# Patient Record
Sex: MALE | Race: WHITE
[De-identification: names, ages, dates, MRNs, and addresses within clinical notes are randomized per-mention and may not be internally consistent; named-entity substitution may affect disease eponyms.]

---

## 2019-08-12 ENCOUNTER — HOSPITAL ENCOUNTER (EMERGENCY)
Age: 49
Discharge: HOME | End: 2019-08-12
Payer: MEDICARE

## 2019-08-12 VITALS
SYSTOLIC BLOOD PRESSURE: 132 MMHG | RESPIRATION RATE: 19 BRPM | HEART RATE: 78 BPM | OXYGEN SATURATION: 97 % | DIASTOLIC BLOOD PRESSURE: 65 MMHG

## 2019-08-12 VITALS
HEART RATE: 115 BPM | RESPIRATION RATE: 18 BRPM | TEMPERATURE: 97.7 F | OXYGEN SATURATION: 97 % | DIASTOLIC BLOOD PRESSURE: 67 MMHG | SYSTOLIC BLOOD PRESSURE: 114 MMHG

## 2019-08-12 VITALS
OXYGEN SATURATION: 97 % | RESPIRATION RATE: 17 BRPM | DIASTOLIC BLOOD PRESSURE: 86 MMHG | SYSTOLIC BLOOD PRESSURE: 123 MMHG | HEART RATE: 97 BPM

## 2019-08-12 VITALS — BODY MASS INDEX: 22.8 KG/M2

## 2019-08-12 VITALS — RESPIRATION RATE: 16 BRPM

## 2019-08-12 DIAGNOSIS — F32.9: Primary | ICD-10-CM

## 2019-08-12 DIAGNOSIS — G10: ICD-10-CM

## 2019-08-12 LAB
ALCOHOL, BLOOD (MEDICAL)-SERUM: < 3 MG/DL
AMPHET UR-MCNC: NEGATIVE NG/ML
ANION GAP: 4 (ref 5–15)
BARBITURATE URINE VISTA: NEGATIVE
BENZODIAZEPINE URINE VISTA: NEGATIVE
BUN SERPL-MCNC: 16 MG/DL (ref 7–18)
BUN/CREAT RATIO: 14.4 RATIO (ref 10–20)
CALCIUM SERPL-MCNC: 9.1 MG/DL (ref 8.5–10.1)
CARBON DIOXIDE: 29 MMOL/L (ref 21–32)
CHLORIDE: 107 MMOL/L (ref 98–107)
COCAINE URINE VISTA: NEGATIVE
DEPRECATED RDW RBC: 43.7 FL (ref 35.1–43.9)
DRUG CONFIRMATION TO FOLLOW?: (no result)
ECSTACY URINE VISTA: NEGATIVE
ERYTHROCYTE [DISTWIDTH] IN BLOOD: 12.7 % (ref 11.6–14.6)
EST GLOM FILT RATE - AFR AMER: 91 ML/MIN (ref 60–?)
ESTIMATED CREATININE CLEARANCE: 81.81 ML/MIN
GLUCOSE: 92 MG/DL (ref 74–106)
HCT VFR BLD AUTO: 44.5 % (ref 40–54)
HEMOGLOBIN: 14.7 G/DL (ref 13–16.5)
HGB BLD-MCNC: 14.7 G/DL (ref 13–16.5)
IMMATURE GRANULOCYTES COUNT: 0.04 X10^3/UL (ref 0–0)
MCV RBC: 92.9 FL (ref 80–94)
MEAN CORP HGB CONC: 33 G/DL (ref 32–36)
MEAN PLATELET VOL.: 9.4 FL (ref 6.2–12)
METHADONE URINE VISTA: NEGATIVE
NRBC FLAGGED BY ANALYZER: 0 % (ref 0–5)
PCP UR QL: NEGATIVE
PH UR: 6 [PH]
PLATELET # BLD: 233 K/MM3 (ref 150–450)
PLATELET COUNT: 233 K/MM3 (ref 150–450)
POTASSIUM: 4 MMOL/L (ref 3.5–5.1)
RBC # BLD AUTO: 4.79 M/MM3 (ref 4.6–6.2)
RBC DISTRIBUTION WIDTH CV: 12.7 % (ref 11.6–14.6)
RBC DISTRIBUTION WIDTH SD: 43.7 FL (ref 35.1–43.9)
THC URINE VISTA: NEGATIVE
WBC # BLD AUTO: 10 K/MM3 (ref 4.4–11)
WHITE BLOOD COUNT: 10 K/MM3 (ref 4.4–11)

## 2019-08-12 PROCEDURE — 80048 BASIC METABOLIC PNL TOTAL CA: CPT

## 2019-08-12 PROCEDURE — G0480 DRUG TEST DEF 1-7 CLASSES: HCPCS

## 2019-08-12 PROCEDURE — 36415 COLL VENOUS BLD VENIPUNCTURE: CPT

## 2019-08-12 PROCEDURE — 80307 DRUG TEST PRSMV CHEM ANLYZR: CPT

## 2019-08-12 PROCEDURE — 85025 COMPLETE CBC W/AUTO DIFF WBC: CPT

## 2019-08-12 PROCEDURE — 99282 EMERGENCY DEPT VISIT SF MDM: CPT

## 2019-08-12 PROCEDURE — 80320 DRUG SCREEN QUANTALCOHOLS: CPT

## 2019-12-19 ENCOUNTER — HOSPITAL ENCOUNTER (OUTPATIENT)
Dept: HOSPITAL 100 - SP | Age: 49
Discharge: HOME | End: 2019-12-19
Payer: MEDICARE

## 2019-12-19 VITALS — BODY MASS INDEX: 22.8 KG/M2

## 2019-12-19 DIAGNOSIS — G10: ICD-10-CM

## 2019-12-19 DIAGNOSIS — I69.891: Primary | ICD-10-CM

## 2019-12-19 DIAGNOSIS — R26.9: ICD-10-CM

## 2019-12-19 DIAGNOSIS — R47.1: ICD-10-CM

## 2019-12-19 PROCEDURE — 92523 SPEECH SOUND LANG COMPREHEN: CPT

## 2019-12-19 PROCEDURE — 97161 PT EVAL LOW COMPLEX 20 MIN: CPT

## 2019-12-19 PROCEDURE — 92610 EVALUATE SWALLOWING FUNCTION: CPT

## 2019-12-19 PROCEDURE — 97166 OT EVAL MOD COMPLEX 45 MIN: CPT

## 2019-12-19 PROCEDURE — 92507 TX SP LANG VOICE COMM INDIV: CPT

## 2019-12-19 PROCEDURE — 97110 THERAPEUTIC EXERCISES: CPT

## 2020-06-25 ENCOUNTER — HOSPITAL ENCOUNTER (OUTPATIENT)
Dept: HOSPITAL 100 - PT | Age: 50
Discharge: HOME | End: 2020-06-25
Payer: MEDICARE

## 2020-06-25 VITALS — BODY MASS INDEX: 22.8 KG/M2

## 2020-06-25 DIAGNOSIS — R25.2: ICD-10-CM

## 2020-06-25 DIAGNOSIS — R26.9: Primary | ICD-10-CM

## 2020-06-25 DIAGNOSIS — G10: ICD-10-CM

## 2020-06-25 DIAGNOSIS — F02.80: ICD-10-CM

## 2020-06-25 PROCEDURE — 97110 THERAPEUTIC EXERCISES: CPT

## 2020-06-25 PROCEDURE — 97164 PT RE-EVAL EST PLAN CARE: CPT

## 2020-06-25 PROCEDURE — 97162 PT EVAL MOD COMPLEX 30 MIN: CPT

## 2021-03-29 ENCOUNTER — HOSPITAL ENCOUNTER (OUTPATIENT)
Dept: HOSPITAL 100 - PT | Age: 51
Discharge: HOME | End: 2021-03-29
Payer: MEDICARE

## 2021-03-29 VITALS — BODY MASS INDEX: 22.8 KG/M2

## 2021-03-29 DIAGNOSIS — G25.5: ICD-10-CM

## 2021-03-29 DIAGNOSIS — G10: Primary | ICD-10-CM

## 2021-03-29 DIAGNOSIS — R26.9: ICD-10-CM

## 2021-03-29 DIAGNOSIS — R25.2: ICD-10-CM

## 2021-03-29 PROCEDURE — 97164 PT RE-EVAL EST PLAN CARE: CPT

## 2021-03-29 PROCEDURE — 97110 THERAPEUTIC EXERCISES: CPT

## 2021-03-29 PROCEDURE — 97530 THERAPEUTIC ACTIVITIES: CPT

## 2021-03-29 PROCEDURE — 97166 OT EVAL MOD COMPLEX 45 MIN: CPT

## 2021-03-29 PROCEDURE — 97162 PT EVAL MOD COMPLEX 30 MIN: CPT

## 2021-12-07 ENCOUNTER — HOSPITAL ENCOUNTER (OUTPATIENT)
Age: 51
End: 2021-12-07
Payer: MEDICARE

## 2021-12-07 DIAGNOSIS — H50.53: ICD-10-CM

## 2021-12-07 DIAGNOSIS — G10: ICD-10-CM

## 2021-12-07 DIAGNOSIS — R13.10: Primary | ICD-10-CM

## 2021-12-07 PROCEDURE — 92611 MOTION FLUOROSCOPY/SWALLOW: CPT

## 2021-12-07 PROCEDURE — 74230 X-RAY XM SWLNG FUNCJ C+: CPT

## 2022-01-21 ENCOUNTER — HOSPITAL ENCOUNTER (EMERGENCY)
Age: 52
Discharge: HOME | End: 2022-01-21
Payer: MEDICARE

## 2022-01-21 VITALS
DIASTOLIC BLOOD PRESSURE: 79 MMHG | OXYGEN SATURATION: 98 % | HEART RATE: 60 BPM | BODY MASS INDEX: 22.5 KG/M2 | SYSTOLIC BLOOD PRESSURE: 118 MMHG | RESPIRATION RATE: 18 BRPM | TEMPERATURE: 96.44 F

## 2022-01-21 VITALS — OXYGEN SATURATION: 98 % | RESPIRATION RATE: 18 BRPM | HEART RATE: 85 BPM

## 2022-01-21 DIAGNOSIS — Z79.899: ICD-10-CM

## 2022-01-21 DIAGNOSIS — R55: Primary | ICD-10-CM

## 2022-01-21 DIAGNOSIS — G10: ICD-10-CM

## 2022-01-21 LAB
ALANINE AMINOTRANSFER ALT/SGPT: 8 U/L (ref 16–61)
ALBUMIN SERPL-MCNC: 4 G/DL (ref 3.2–5)
ALKALINE PHOSPHATASE: 111 U/L (ref 45–117)
ANION GAP: 3 (ref 5–15)
AST(SGOT): < 3 U/L (ref 15–37)
BUN SERPL-MCNC: 19 MG/DL (ref 7–18)
BUN/CREAT RATIO: 16.8 RATIO (ref 10–20)
CALCIUM SERPL-MCNC: 9.2 MG/DL (ref 8.5–10.1)
CARBON DIOXIDE: 29 MMOL/L (ref 21–32)
CHLORIDE: 103 MMOL/L (ref 98–107)
DEPRECATED RDW RBC: 43.4 FL (ref 35.1–43.9)
ERYTHROCYTE [DISTWIDTH] IN BLOOD: 12.9 % (ref 11.6–14.6)
EST GLOM FILT RATE - AFR AMER: 88 ML/MIN (ref 60–?)
ESTIMATED CREATININE CLEARANCE: 74.46 ML/MIN
GLOBULIN: 4 G/DL (ref 2.2–4.2)
GLUCOSE: 128 MG/DL (ref 74–106)
HCT VFR BLD AUTO: 45.9 % (ref 40–54)
HEMOGLOBIN: 15.2 G/DL (ref 13–16.5)
HGB BLD-MCNC: 15.2 G/DL (ref 13–16.5)
IMMATURE GRANULOCYTES COUNT: 0.01 X10^3/UL (ref 0–0)
MCV RBC: 92 FL (ref 80–94)
MEAN CORP HGB CONC: 33.1 G/DL (ref 32–36)
MEAN PLATELET VOL.: 9.3 FL (ref 6.2–12)
NRBC FLAGGED BY ANALYZER: 0 % (ref 0–5)
PLATELET # BLD: 257 K/MM3 (ref 150–450)
PLATELET COUNT: 257 K/MM3 (ref 150–450)
POTASSIUM: 4.1 MMOL/L (ref 3.5–5.1)
RBC # BLD AUTO: 4.99 M/MM3 (ref 4.6–6.2)
RBC DISTRIBUTION WIDTH CV: 12.9 % (ref 11.6–14.6)
RBC DISTRIBUTION WIDTH SD: 43.4 FL (ref 35.1–43.9)
WBC # BLD AUTO: 5.2 K/MM3 (ref 4.4–11)
WHITE BLOOD COUNT: 5.2 K/MM3 (ref 4.4–11)

## 2022-01-21 PROCEDURE — 80053 COMPREHEN METABOLIC PANEL: CPT

## 2022-01-21 PROCEDURE — 93005 ELECTROCARDIOGRAM TRACING: CPT

## 2022-01-21 PROCEDURE — 99285 EMERGENCY DEPT VISIT HI MDM: CPT

## 2022-01-21 PROCEDURE — 85025 COMPLETE CBC W/AUTO DIFF WBC: CPT

## 2022-01-21 PROCEDURE — A4216 STERILE WATER/SALINE, 10 ML: HCPCS

## 2022-01-21 PROCEDURE — 71045 X-RAY EXAM CHEST 1 VIEW: CPT

## 2022-02-15 ENCOUNTER — HOSPITAL ENCOUNTER (OUTPATIENT)
Dept: HOSPITAL 100 - PT | Age: 52
Discharge: HOME | End: 2022-02-15
Payer: MEDICARE

## 2022-02-15 DIAGNOSIS — R26.9: ICD-10-CM

## 2022-02-15 DIAGNOSIS — R49.0: ICD-10-CM

## 2022-02-15 DIAGNOSIS — F02.80: ICD-10-CM

## 2022-02-15 DIAGNOSIS — G25.5: Primary | ICD-10-CM

## 2022-02-15 PROCEDURE — 97166 OT EVAL MOD COMPLEX 45 MIN: CPT

## 2022-02-15 PROCEDURE — 97164 PT RE-EVAL EST PLAN CARE: CPT

## 2022-02-15 PROCEDURE — 92507 TX SP LANG VOICE COMM INDIV: CPT

## 2022-02-15 PROCEDURE — 97530 THERAPEUTIC ACTIVITIES: CPT

## 2022-02-15 PROCEDURE — 97161 PT EVAL LOW COMPLEX 20 MIN: CPT

## 2022-02-15 PROCEDURE — 97110 THERAPEUTIC EXERCISES: CPT

## 2022-02-15 PROCEDURE — 92524 BEHAVRAL QUALIT ANALYS VOICE: CPT

## 2022-02-15 PROCEDURE — 92526 ORAL FUNCTION THERAPY: CPT

## 2022-12-07 ENCOUNTER — HOSPITAL ENCOUNTER (OUTPATIENT)
Dept: HOSPITAL 100 - SP | Age: 52
Discharge: HOME | End: 2022-12-07
Payer: MEDICARE

## 2022-12-07 DIAGNOSIS — R49.0: ICD-10-CM

## 2022-12-07 DIAGNOSIS — G25.5: ICD-10-CM

## 2022-12-07 DIAGNOSIS — F02.80: ICD-10-CM

## 2022-12-07 DIAGNOSIS — G10: Primary | ICD-10-CM

## 2022-12-07 DIAGNOSIS — R13.12: ICD-10-CM

## 2022-12-07 DIAGNOSIS — R25.2: ICD-10-CM

## 2022-12-07 DIAGNOSIS — R49.8: ICD-10-CM

## 2022-12-07 PROCEDURE — 92526 ORAL FUNCTION THERAPY: CPT

## 2022-12-07 PROCEDURE — 92610 EVALUATE SWALLOWING FUNCTION: CPT

## 2022-12-07 PROCEDURE — 92507 TX SP LANG VOICE COMM INDIV: CPT

## 2023-01-18 ENCOUNTER — HOSPITAL ENCOUNTER (OUTPATIENT)
Dept: HOSPITAL 100 - SP | Age: 53
Discharge: HOME | End: 2023-01-18
Payer: MEDICARE

## 2023-01-18 DIAGNOSIS — R13.10: Primary | ICD-10-CM

## 2023-01-18 DIAGNOSIS — G10: ICD-10-CM

## 2023-01-18 DIAGNOSIS — G25.5: ICD-10-CM

## 2023-01-18 DIAGNOSIS — F02.80: ICD-10-CM

## 2023-01-18 DIAGNOSIS — R49.8: ICD-10-CM

## 2023-01-18 DIAGNOSIS — R25.2: ICD-10-CM

## 2023-01-18 PROCEDURE — 92526 ORAL FUNCTION THERAPY: CPT

## 2023-12-06 ENCOUNTER — HOSPITAL ENCOUNTER (OUTPATIENT)
Dept: HOSPITAL 100 - PT | Age: 53
Discharge: HOME | End: 2023-12-06
Payer: MEDICARE

## 2023-12-06 DIAGNOSIS — F02.80: ICD-10-CM

## 2023-12-06 DIAGNOSIS — G10: Primary | ICD-10-CM

## 2023-12-06 DIAGNOSIS — R26.9: ICD-10-CM

## 2023-12-06 DIAGNOSIS — R25.2: ICD-10-CM

## 2023-12-06 DIAGNOSIS — R49.8: ICD-10-CM

## 2023-12-06 PROCEDURE — 97530 THERAPEUTIC ACTIVITIES: CPT

## 2023-12-06 PROCEDURE — 97162 PT EVAL MOD COMPLEX 30 MIN: CPT

## 2023-12-06 PROCEDURE — 92524 BEHAVRAL QUALIT ANALYS VOICE: CPT

## 2023-12-06 PROCEDURE — 97166 OT EVAL MOD COMPLEX 45 MIN: CPT

## 2023-12-06 PROCEDURE — 97110 THERAPEUTIC EXERCISES: CPT

## 2024-02-01 ENCOUNTER — HOSPITAL ENCOUNTER (EMERGENCY)
Age: 54
LOS: 1 days | Discharge: PSYCHIATRIC OR SUBSTANCE USE FACILITY | End: 2024-02-02
Payer: MEDICARE

## 2024-02-01 VITALS
RESPIRATION RATE: 18 BRPM | TEMPERATURE: 97.2 F | SYSTOLIC BLOOD PRESSURE: 125 MMHG | DIASTOLIC BLOOD PRESSURE: 84 MMHG | HEART RATE: 65 BPM | OXYGEN SATURATION: 97 %

## 2024-02-01 VITALS
HEART RATE: 82 BPM | DIASTOLIC BLOOD PRESSURE: 92 MMHG | SYSTOLIC BLOOD PRESSURE: 129 MMHG | RESPIRATION RATE: 20 BRPM | OXYGEN SATURATION: 98 % | TEMPERATURE: 97.34 F

## 2024-02-01 VITALS — BODY MASS INDEX: 19.5 KG/M2

## 2024-02-01 DIAGNOSIS — Z72.89: ICD-10-CM

## 2024-02-01 DIAGNOSIS — Z79.899: ICD-10-CM

## 2024-02-01 DIAGNOSIS — F32.A: ICD-10-CM

## 2024-02-01 DIAGNOSIS — G10: ICD-10-CM

## 2024-02-01 DIAGNOSIS — R45.850: Primary | ICD-10-CM

## 2024-02-01 LAB
ALANINE AMINOTRANSFER ALT/SGPT: 18 U/L (ref 16–61)
ALBUMIN SERPL-MCNC: 4 G/DL (ref 3.2–5)
ALCOHOL, BLOOD (MEDICAL)-SERUM: < 3 MG/DL
ALKALINE PHOSPHATASE: 107 U/L (ref 45–117)
AMPHET UR-MCNC: NEGATIVE NG/ML
ANION GAP: 3 (ref 5–15)
AST(SGOT): 13 U/L (ref 15–37)
BARBITURATE URINE VISTA: NEGATIVE
BENZODIAZEPINE URINE VISTA: NEGATIVE
BUN SERPL-MCNC: 20 MG/DL (ref 7–18)
BUN/CREAT RATIO: 17.7 RATIO (ref 10–20)
CALCIUM SERPL-MCNC: 9.6 MG/DL (ref 8.5–10.1)
CARBON DIOXIDE: 28 MMOL/L (ref 21–32)
CHLORIDE: 106 MMOL/L (ref 98–107)
COCAINE URINE VISTA: NEGATIVE
DEPRECATED RDW RBC: 42 FL (ref 35.1–43.9)
DRUG CONFIRMATION TO FOLLOW?: (no result)
ECSTACY URINE VISTA: NEGATIVE
ERYTHROCYTE [DISTWIDTH] IN BLOOD: 12.3 % (ref 11.6–14.6)
EST GLOM FILT RATE - AFR AMER: 88 ML/MIN (ref 60–?)
ESTIMATED CREATININE CLEARANCE: 65.11 ML/MIN
GLOBULIN: 4 G/DL (ref 2.2–4.2)
GLUCOSE: 99 MG/DL (ref 74–106)
HCT VFR BLD AUTO: 47 % (ref 40–54)
HGB BLD-MCNC: 15.2 G/DL (ref 13–16.5)
IMMATURE GRANULOCYTES COUNT: 0.04 X10^3/UL (ref 0–0)
MCV RBC: 92.7 FL (ref 80–94)
MEAN CORP HGB CONC: 32.3 G/DL (ref 32–36)
MEAN PLATELET VOL.: 9.4 FL (ref 6.2–12)
METHADONE URINE VISTA: NEGATIVE
NRBC FLAGGED BY ANALYZER: 0 % (ref 0–5)
PCP UR QL: NEGATIVE
PH UR: 6 [PH]
PLATELET # BLD: 221 K/MM3 (ref 150–450)
POTASSIUM: 4.1 MMOL/L (ref 3.5–5.1)
RBC # BLD AUTO: 5.07 M/MM3 (ref 4.6–6.2)
THC URINE VISTA: NEGATIVE
WBC # BLD AUTO: 10.2 K/MM3 (ref 4.4–11)

## 2024-02-01 PROCEDURE — 80320 DRUG SCREEN QUANTALCOHOLS: CPT

## 2024-02-01 PROCEDURE — 80307 DRUG TEST PRSMV CHEM ANLYZR: CPT

## 2024-02-01 PROCEDURE — G0480 DRUG TEST DEF 1-7 CLASSES: HCPCS

## 2024-02-01 PROCEDURE — 85025 COMPLETE CBC W/AUTO DIFF WBC: CPT

## 2024-02-01 PROCEDURE — 99283 EMERGENCY DEPT VISIT LOW MDM: CPT

## 2024-02-01 PROCEDURE — 93005 ELECTROCARDIOGRAM TRACING: CPT

## 2024-02-01 PROCEDURE — 80053 COMPREHEN METABOLIC PANEL: CPT
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
## 2024-02-01 NOTE — XMS RPT_ITS
Comprehensive CCD (C-CDA v2.1)  
  
                          Created on: 2024  
  
  
Chepe Jenkins  
External Reference #: CDR,PersonID:[id number]  
: 1970  
Sex: Male  
  
Demographics  
  
  
                                        Address             [address] [place]  [address]  
   
                                        Home Phone          [phone]  
   
                                        Mobile Phone        [phone]  
   
                                        Preferred Language  en  
   
                                        Marital Status      Single  
   
                                        Christianity Affiliation Unknown  
   
                                        Race                White  
   
                                        Ethnic Group        Unknown  
  
  
Author  
  
  
                                        Name                Unknown  
   
                                        Address             [address]  
[address]  
[place]  [address]  
   
                                        Phone               [phone]  
   
                                        Organization        [facility]  
  
  
Care Team Providers  
  
  
                                Care Team Member Name Role            Phone  
   
                                DESTINEE RENEE Unavailable     Unavailable  
   
                                [facility]            Unavailable     Unavailable  
   
                                Unavailable     Primary Care Provider Unavailclarissa  
e  
   
                                Unavailable     Primary Care Provider Unavailabl  
e  
   
                                Unavailable     Primary Care Provider UnavailCristopher Pete MD Primary Care Provider   
[phone]  
   
                                Podlogar APRN.Teresa CARDOSO Unavailable     [phone]   
   
                                DESTINEE RENEE JR Attending       Unavailable  
   
                                DESTINEE RENEE JR Attending       Unavailable  
   
                                CRISTOPHER CARMONA Primary Care    Unavailab  
le  
   
                                TERESA PINEDA Referring       Unavailable  
   
                                PODLOGTERESA RECIO Attending       Unavailable  
   
                                DESTINEE RENEE JR Referring       Unavailable  
   
                                CRISTOPHER CARMONA Primary Care    Unavailab  
DESTINEE García JR Attending       Unavailable  
  
  
  
Medications  
Current Medications  
  
  
  
                      Medication Drug Class(es) Dates      Sig (Normalized) Sig   
(Original)  
   
                                                    amantadine   
hydrochloride 100 mg   
oral capsule  
(20 sources)                            Influenza A M2   
Protein   
Inhibitor                               Start:   
2023  
End:   
2024                              take 1 capsule by   
mouth twice daily                       amantadine HCl   
(SYMMETREL) 100   
mg capsule Take 1   
capsule by mouth   
twice daily. 180   
capsule 1   
2023 Active  
  
  
  
                                          
   
                                          
   
                                          
   
                                          
   
                                          
   
                                          
   
                                          
   
                                          
   
                                          
   
                                          
   
                                          
   
                                          
   
                                          
   
                                          
   
                                          
  
  
  
Completed/Discontinued Medications  
  
  
  
                      Medication Drug Class(es) Dates      Sig (Normalized) Sig   
(Original)  
   
                                                    baclofen 10 mg   
oral tablet  
(20 sources)                            gamma-Aminobutyri  
c Acid-ergic   
Agonist                                 Start:   
06-  
End:   
2023                              take 1 tablet by   
mouth three times   
daily                                   baclofen 10 mg   
tablet Take 1   
tablet by mouth   
three times daily.   
270 tablet 0   
2023   
Discontinued  
  
  
  
                                          
   
                                          
   
                                          
   
                                          
   
                                          
   
                                          
   
                                          
   
                                          
   
                                          
   
                                          
   
                                          
   
                                          
  
  
  
Problems  
Active Problems  
  
  
                      Problem Classification Problem    Date       Documented Da  
te Episodic/Chronic  
   
                                                    Administrative/social   
admission  
(3 sources)                             Patient encounter   
status;   
Translations:   
[Persons   
encountering health   
services in other   
specified   
circumstances]                          Onset:   
2023                Episodic  
   
                                                    Delirium dementia and   
amnestic and other   
cognitive disorders  
(20 sources)                            Dementia in other   
diseases classified   
elsewhere without   
behavioral   
disturbance;   
Translations:   
[Dementia   
associated with   
another disease]                        Onset:   
2018                Chronic  
   
                                                    Genitourinary symptoms   
and ill-defined   
conditions  
(2 sources)                             Urinary   
incontinence;   
Translations:   
[Unspecified   
urinary   
incontinence]                           Onset:   
2023                Chronic  
   
                                                    Mood disorders  
(20 sources)                            Depressive   
disorder;   
Translations:   
[Other specified   
depressive   
episodes]                               Onset:   
10-                04-                Chronic  
   
                                                    Other hereditary and   
degenerative nervous   
system conditions  
(20 sources)                            Glacier's   
chorea;   
Translations:   
[Glacier's   
disease]                                Onset:   
2008                Chronic  
   
                                                    Other hereditary and   
degenerative nervous   
system conditions  
(3 sources)                             Chorea;   
Translations:   
[Other chorea]                                              Chronic  
   
                                                    Other hereditary and   
degenerative nervous   
system conditions  
(3 sources)                             Glacier's   
disease;   
Translations:   
[Glacier's   
disease (HCC)]                          Onset:   
2018                                          Chronic  
   
                                                    Other hereditary and   
degenerative nervous   
system conditions  
(1 source)                              Other chorea;   
Translations:   
[Chorea]                                Onset:   
2023                                          Chronic  
   
                                                    Other nervous system   
disorders  
(1 source)                              Secondary   
parkinsonism;   
Translations:   
[Secondary   
parkinsonism,   
unspecified]                            2023          Chronic  
   
                                                    Other nervous system   
disorders  
(1 source)                              Secondary   
parkinsonism,   
unspecified;   
Translations:   
[Secondary   
parkinsonism,   
unspecified   
secondary   
Parkinsonism type   
(HCC)]                                  Onset:   
2023                                          Chronic  
   
                                                    Other screening for   
suspected conditions   
(not mental disorders   
or infectious disease)  
(3 sources)                             Raised prostate   
specific antigen;   
Translations:   
[Elevated prostate   
specific antigen   
[PSA]]                                  Onset:   
2023                Episodic  
   
                                                    Other upper   
respiratory disease  
(3 sources)                             Hypophonia;   
Translations:   
[Other voice and   
resonance   
disorders]                                                  Episodic  
   
                                                    Unclassified  
(1 source)                              Unknown /   
UNK(Unknown)                            Onset:   
2018                                            
   
                                                    Unclassified  
(1 source)                              APPOINTMENT   
CANCELLED                                                     
  
  
Past or Other Problems  
  
  
                      Problem Classification Problem    Date       Documented Da  
te Episodic/Chronic  
   
                                                    Genitourinary symptoms   
and ill-defined   
conditions  
(1 source)                              Urgency of   
urination;   
Translations:   
[Urinary urgency]                       Onset:   
2023                                          Episodic  
   
                                                    Other connective tissue   
disease  
(20 sources)                            Spasticity;   
Translations:   
[Cramp and spasm]                       Onset:   
2018                Episodic  
   
                                                    Other connective tissue   
disease  
(1 source)                              Cramp and spasm;   
Translations:   
[Spasticity]                            Onset:   
2018                                          Episodic  
   
                                                    Other gastrointestinal   
disorders  
(20 sources)                            Dysphagia;   
Translations:   
[Dysphagia,   
unspecified]                            Onset:   
2018                Episodic  
   
                                                    Other nervous system   
disorders  
(20 sources)                            Abnormal gait;   
Translations:   
[Unspecified   
abnormalities of   
gait and mobility]                      Onset:   
2018                Episodic  
   
                                                    Other nervous system   
disorders  
(20 sources)                            Dysarthria;   
Translations:   
[Dysarthria and   
anarthria]                              Onset:   
2018                Episodic  
   
                                                    Other nervous system   
disorders  
(1 source)                              Unspecified   
abnormalities of   
gait and mobility;   
Translations:   
[Abnormality of   
gait]                                   Onset:   
2018                                          Episodic  
   
                                                    Other upper respiratory   
disease  
(1 source)                              Other voice and   
resonance   
disorders;   
Translations:   
[Hypophonia]                            Onset:   
2023                                          Episodic  
  
  
  
Results  
  
  
                      Test Name  Value      Interpretation Reference Range Facil  
ity  
  
  
  
                                          
   
                                          
   
                                          
   
                                          
   
                                          
   
                                          
   
                                          
   
                                          
   
                                          
   
                                          
   
                                          
   
                                          
   
                                          
   
                                          
   
                                          
   
                                          
   
                                          
   
                                          
   
                                          
   
                                          
   
                                          
   
                                          
   
                                          
   
                                          
   
                                          
   
                                          
   
                                          
   
                                          
   
                                          
   
                                          
   
                                          
   
                                          
   
                                          
   
                                          
   
                                          
   
                                          
   
                                          
   
                                          
   
                                          
   
                                          
   
                                          
   
                                          
   
                                          
   
                                          
   
                                          
   
                                          
   
                                          
   
                                          
   
                                          
   
                                          
   
                                          
   
                                          
   
                                          
   
                                          
   
                                          
   
                                          
   
                                          
   
                                          
   
                                          
   
                                          
   
                                          
   
                                          
   
                                          
   
                                          
   
                                          
   
                                          
   
                                          
   
                                          
   
                                          
   
                                          
   
                                          
   
                                          
   
                                          
   
                                          
   
                                          
   
                                          
   
                                          
   
                                          
   
                                          
   
                                          
   
                                          
   
                                          
   
                                          
   
                                          
   
                                          
   
                                          
   
                                          
   
                                          
   
                                          
   
                                          
   
                                          
   
                                          
   
                                          
   
                                          
   
                                          
   
                                          
   
                                          
   
                                          
   
                                          
   
                                          
   
                                          
   
                                          
   
                                          
   
                                          
   
                                          
   
                                          
   
                                          
   
                                          
   
                                          
   
                                          
   
                                          
   
                                          
   
                                          
   
                                          
   
                                          
   
                                          
   
                                          
   
                                          
   
                                          
   
                                          
   
                                          
   
                                          
   
                                          
   
                                          
   
                                          
   
                                          
   
                                          
   
                                          
   
                                          
   
                                          
   
                                          
   
                                          
   
                                          
   
                                          
   
                                          
   
                                          
   
                                          
   
                                          
   
                                          
   
                                          
   
                                          
   
                                          
   
                                          
   
                                          
   
                                          
   
                                          
   
                                          
   
                                          
   
                                          
   
                                          
   
                                          
   
                                          
   
                                          
   
                                          
   
                                          
   
                                          
   
                                          
   
                                          
   
                                          
   
                                          
   
                                          
   
                                          
   
                                          
   
                                          
   
                                          
   
                                          
   
                                          
   
                                          
   
                                          
   
                                          
   
                                          
   
                                          
   
                                          
   
                                          
   
                                          
   
                                          
   
                                          
   
                                          
   
                                          
   
                                          
   
                                          
   
                                          
   
                                          
   
                                          
   
                                          
   
                                          
   
                                          
   
                                          
   
                                          
   
                                          
   
                                          
   
                                          
   
                                          
   
                                          
   
                                          
   
                                          
   
                                          
   
                                          
   
                                          
   
                                          
   
                                          
   
                                          
   
                                          
   
                                          
   
                                          
   
                                          
   
                                          
   
                                          
   
                                          
   
                                          
   
                                          
   
                                          
   
                                          
   
                                          
   
                                          
   
                                          
   
                                          
   
                                          
   
                                          
   
                                          
   
                                          
   
                                          
   
                                          
   
                                          
   
                                          
   
                                          
   
                                          
   
                                          
   
                                          
   
                                          
   
                                          
   
                                          
   
                                          
   
                                          
   
                                          
   
                                          
   
                                          
   
                                          
   
                                          
   
                                          
   
                                          
   
                                          
   
                                          
  
  
  
Vital Signs  
  
  
                      Date Time  Vital Sign Value      Performing Clinician Carin esteves  
   
                                                    2023   
08:          Body height         170.8 cm            Teresa Podlogar   
APRN.CNP  
Work Phone:   
[phone]                          [facility]  
   
                                                    2023   
08:          Body weight         61.78 kg            Teresa Podlogar   
APRN.CNP  
Work Phone:   
[phone]                          [facility]  
   
                                                    2023   
08:                              Diastolic blood   
pressure                  70 mm[Hg]                 Teresa Podlogar   
APRN.CNP  
Work Phone:   
[phone]                          [facility]  
   
                                                    2023   
08:          Heart rate          71 /min             Teresa Podlogar   
APRN.CNP  
Work Phone:   
[phone]                          [facility]  
   
                                                    2023   
08:          Respiratory rate    18 /min             Teresa Podlogar   
APRN.CNP  
Work Phone:   
[phone]                          [facility]  
   
                                                    2023   
08:                              SaO2% (BldA) [Mass   
fraction]                 9 %                       Teresa Podlogar   
APRN.CNP  
Work Phone:   
[phone]                          [facility]  
   
                                                    2023   
08:                              Systolic blood   
pressure                  102 mm[Hg]                Teresa Podlogar   
APRN.CNP  
Work Phone:   
[phone]                          [facility]  
   
                                                    10-   
13:                              Diastolic blood   
pressure                  90 mm[Hg]                 Destinee Rneee Jr., MD  
Work Phone:   
[phone]                          [facility]  
   
                                                    10-   
13:                              Systolic blood   
pressure                  118 mm[Hg]                Destinee Renee Jr., MD  
Work Phone:   
[phone]                          [facility]  
   
                                                    10-   
13:          Body weight         63.05 kg            Destinee Renee Jr., MD  
Work Phone:   
[phone]                          [facility]  
   
                                                    10-   
13:          Heart rate          95 /min             Destinee Renee Jr., MD  
Work Phone:   
[phone]                          [facility]  
   
                                                    10-   
13:          Respiratory rate    24 /min             Destinee Renee Jr., MD  
Work Phone:   
[phone]                          [facility]  
   
                                                    10-   
13:                              SaO2% (BldA) [Mass   
fraction]                 98 %                      Destinee Renee Jr., MD  
Work Phone:   
[phone]                          [facility]  
  
  
  
Encounters  
  
  
                          Encounter Date Encounter Type Care Provider Facility  
   
                                Start: 2023 Refill          Destinee radford MD  
Work Phone: [phone]              [facility]  
  
  
  
                                          
   
                                          
   
                                          
   
                                          
   
                                          
   
                                          
   
                                          
   
                                          
   
                                          
   
                                          
   
                                          
   
                                          
   
                                          
   
                                          
   
                                          
   
                                          
   
                                          
   
                                          
   
                                          
   
                                          
   
                                          
   
                                          
   
                                          
   
                                          
   
                                          
   
                                          
   
                                          
   
                                          
   
                                          
   
                                          
   
                                          
   
                                          
   
                                          
   
                                          
   
                                          
   
                                          
   
                                          
   
                                          
   
                                          
   
                                          
   
                                          
   
                                          
   
                                          
   
                                          
   
                                          
   
                                          
  
  
  
Procedures  
  
  
                          Date         Procedure    Procedure Detail Performing   
Clinician  
   
                                        Start: 2023   Lipid 1996 panel - S  
hill   
or Plasma                                           Destinee Renee Jr., MD  
Work Phone:   
[phone]  
  
  
  
Plan of Treatment  
  
  
                          Date         Care Activity Detail       Author  
   
                                        Start: 2028   Lipid 1996 panel - S  
hill or   
Plasma                    Lipid Screening           [facility]  
   
                          Start: 2028 LIPID SCREEN LIPID SCREEN [facility]  
   
                          Start: 2026 DIABETES SCREEN DIABETES SCREEN [facility]  
   
                          Start: 2026 Diabetes Screening Diabetes Screenin  
g [facility]  
   
                          Start: 2024 COVID-19 VACCINE (#1) COVID-19 VACCI  
NE (#1) [facility]  
  
  
  
                                          
   
                                          
   
                                          
   
                                          
   
                                          
   
                                          
   
                                          
   
                                          
   
                                          
   
                                          
   
                                          
   
                                          
   
                                          
   
                                          
   
                                          
   
                                          
   
                                          
   
                                          
   
                                          
   
                                          
   
                                          
   
                                          
   
                                          
   
                                          
   
                                          
   
                                          
   
                                          
   
                                          
   
                                          
   
                                          
   
                                          
   
                                          
   
                                          
   
                                          
   
                                          
   
                                          
   
                                          
   
                                          
   
                                          
   
                                          
   
                                          
   
                                          
   
                                          
   
                                          
   
                                          
   
                                          
   
                                          
   
                                          
   
                                          
   
                                          
  
  
  
Payers  
  
  
                          Date         Payer Category Payer        Policy ID  
   
                                2021      Medicaid [employer]   
[id number] 2021-Present   
[phone] [address]   
[place] [address]   
Medicaid                                [id number]  
[id number]  
   
                          2021   Medicare                  [id number]   
[id number]  
[id number]  
   
                                2021      Medicare        [employer]  
ARE MYCARE   
[employer]   
[id number] 2021-Present   
[phone] [address]   
[place] [address]   
Medicare                                [id number]  
[id number]  
   
                          2021   Medicare                  [id number]  
   
                          1970   Unknown                   [id number]   
[id number]  
   
                                       Medicare                  [id number]  
  
  
  
Social History  
  
  
                          Date         Type         Detail       Facility  
   
                                                    Start: 2018  
End: 10-                         Tobacco smoking status   
NHIS                      Ex-smoker                 [facility]  
   
                                                      
End: 2018     History of tobacco use Current smoker      [facility]  
   
                                                      
End: 2018     History of tobacco use Cigarette Smoker    [facility]  
   
                                                    Start: 2018  
End: 10-                         Tobacco use and   
exposure                                Smokeless tobacco   
non-user                                [facility]  
   
                                                    Start: 2021  
End: 2023           Alcohol intake            Lifetime non-drinker   
(finding)                               [facility]  
   
                                        Start: 2019   History SDOH Alcohol  
   
Frequency                 1                         [facility]  
   
                          Start: 1970 Sex Assigned At Birth Not on file  C  
[facility]  
   
                                                    Start: 2022  
End: 2022                         Exposure to SARS-CoV-2   
(event)                   Unable to assess          [facility]  
Work Phone:   
[phone]  
   
                                                    Start: 2018  
End: 2023     Cigarette pack-years                     [facility]  
   
                                                    Start: 2023  
End: 2023     Tobacco use panel                       [facility]  
   
                                                            Adult Depression   
Screening Assessment      3                         [facility]  
   
                                                            How often to you hav  
e a   
drink containing   
alcohol?                  Never                     [facility]  
  
  
  
Clinical Notes 2008 to 2023  
     Telephone Encounter - Chasity Delgado MA - 2023 10:05 AM   
EDTTelephone Encounter - Susie Jenkins Ma - 2023 12:48 PM EDTPodlogar, 
CHAYITO Blount.CNP - 2023 8:48 AM EDT  
  
                                Note Date & Type Note            Facility  
   
                                        2023 Miscellaneous Notes Formattin  
g of this note might   
be different from the   
original.  
LOV: 23 with [facility]  
NOV: 23-4 month f/u with   
[facility]  
  
Last refill: 23 With 270   
and 0 refills  
  
Chasity Delgado MA  
  
ASSESSMENT/PLAN:  
1. Glacier chorea (HCC) -   
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   
(primary diagnosis)  
2. Spasticity - ICD9: 781.0,   
ICD10: R25.2  
3. Abnormality of gait - ICD9:   
781.2, ICD10: R26.9  
4. Hypophonia - ICD9: 784.49,   
ICD10: R49.8  
5. Dementia associated with   
other underlying disease   
without behavioral disturbance   
(HCC) - ICD9: 294.10, ICD10:   
F02.80  
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  
7. Secondary parkinsonism,   
unspecified secondary   
Parkinsonism type (HCC) -   
ICD9: 332.1, ICD10: G21.9   
Overall patient's exam   
improved today vs last visit.   
This includes improvement in   
speech. Question to what   
degree his now being on   
Sinemet more regularly is   
playing a role. For that   
reason, I would like them to   
increase the dose to Sinemet   
CR 50/200 Q8 hours (as   
previously Rx'd) as might   
further benefit patient.   
Otherwise, with symptoms for   
the most part stable or   
improved, will not change any   
of his other meds at this   
time. Encouraged pt to take   
part in PT/OT, and will reach   
out to see as to why speech   
therapy was delayed. Pt and   
his mother agree with plan.   
Follow up during the holiday   
season of  or sooner prn.  
  
  
8. Urinary urgency - ICD9:   
788.63, ICD10: R39.15  
9. Elevated PSA - ICD9:   
790.93, ICD10: R97.20  
Encouraged pt to establish   
with PCP for these symptoms as   
well as elevated Cholesterol.   
Attempted to get in with   
urology sooner but would be   
same wait as to get in with   
PCP at this time. Message left   
with IM/FP to try to get   
patient in sooner.  
  
  
  
Destinee Renee MD  
Electronically signed by   
Chasity Delgado MA at   
2023 10:06 AM EDT  
documented in this encounter            [facility]  
   
                                        2023 Miscellaneous Notes Formattin  
g of this note might   
be different from the   
original.  
Mychart message sent to pt   
notifying him of normal urine   
test.  
  
Susie Jenkins Ma  
  
Electronically signed by   
Susie Jenkins Ma at 2023   
12:48 PM EDT  
Formatting of this note might   
be different from the   
original.  
----- Message from CHAYITO Eagle.CNP sent at   
2023 6:45 AM EDT -----  
Urine normal.  
CHAYITO Eagle.CNP  
  
Electronically signed by   
Susie Jenkins Ma at 2023   
12:48 PM EDT  
documented in this encounter            [facility]  
   
                                        2023 Note     HNO ID: [id number]  
Author: Teresa Pineda APRN.CNP  
Service: ?  
Author Type: Nurse   
Practitioner  
Type: Progress Notes  
Filed: 2023 9:34 AM  
Note Text:  
2023  
Patient presents with:  
Establish Care  
SUBJECTIVE: This is a 53 year   
old, accompanied by mother,   
that is here  
today for Above Complaints.  
Follows with neurology, Dr. Renee, every 3-6 months for hx   
of Glacier  
Chorea. Working with PT at   
this time for balance. No   
recent falls. Per  
mother he does not like to use   
cane or walker  
Taking Zoloft for depression.   
Works well to control symptoms  
PSA elevated on recent labs.   
Has some urinary incontinence   
at times on his  
way to the restroom. Denies   
weak stream, straining to   
urinate, frequency,  
dysuria or hematuria  
Past medical, surgical,   
family, social hx,   
medications, allergies and  
health maintenance reviewed   
and updated  
PAST MEDICAL HISTORY  
Diagnosis Date  
Hyperlipidemia  
ALLERGIES Patient has no known   
allergies.  
MEDICATIONS  
Current Outpatient Medications  
Medication Sig  
carbidopa-levodopa CR (SINEMET   
CR)  mg per tablet Take   
1 tablet by  
mouth three times daily.   
(approximately 6-8 hours   
apart)  
baclofen 10 mg tablet Take 1   
tablet by mouth three times   
daily.  
memantine (NAMENDA) 10 mg   
tablet Take 1 tablet by mouth   
twice daily.  
levETIRAcetam (KEPPRA) 750 mg   
tablet Take 2 tablets by mouth   
twice daily.  
amantadine HCl (SYMMETREL) 100   
mg capsule Take 1 capsule by   
mouth twice  
daily.  
sertraline (ZOLOFT) 100 mg   
tablet Take 1 tablet by mouth   
once daily.  
No current   
facility-administered   
medications for this visit.  
Medications and allergies   
reviewed by this provider.  
SOCIAL HISTORY  
Social History  
Tobacco Use  
Smoking status: Former  
Packs/day: 0.00  
Years: 15.00  
Additional pack years: 0.00  
Total pack years: 0.00  
Types: Cigarettes  
Quit date: 2018  
Years since quittin.5  
Smokeless tobacco: Never  
Vaping Use  
Vaping Use: Never used  
Substance Use Topics  
Alcohol use: Never  
Drug use: Never  
REVIEW OF SYSTEMS  
GENERAL: No weight loss,   
malaise or fevers  
HEENT: Negative for frequent   
or significant headaches, No   
changes in  
hearing or vision, no nose   
bleeds or other nasal problems  
NECK: Negative for lumps,   
goiter, pain and significant   
neck swelling  
RESPIRATORY: Negative for   
cough, hemoptysis, wheezing,   
COPD, dyspnea or  
shortness of breath  
CARDIOVASCULAR: Negative for   
chest pain, leg swelling,   
hypertension, CHF  
or palpitations  
GI: No nausea, vomiting, or   
diarrhea  
: No history of dysuria,   
frequency. Positive for hx   
urinary incontinence  
MUSCULOSKELETAL: Hx of Hun  
SKIN: Negative for lesions,   
rash, and itching  
PSYCH: Negative for sleep   
disturbance, mood disorder and   
recent  
psychosocial stressors  
HEMATOLOGY/LYMPHOLOGY:   
Negative for prolonged   
bleeding, bruising easily or  
swollen nodes  
ENDOCRINE: Negative for cold   
or heat intolerance, polyuria,   
polydipsia and  
goiter  
NEURO: No history of   
headaches, syncope, paralysis,   
seizures  
All other reviewed and   
negative other than HPI.  
OBJECTIVE:  
/70   Pulse 71   Resp 18   
  Ht 170.8 cm (5' 7.24 )   Wt   
61.8 kg  
(136 lb 3.2 oz)   SpO2 (!) 9%   
  BMI 21.18 kg/m? . Vital   
signs reviewed  
by this provider.  
APPEARANCE Well appearing,   
alert, in no acute distress,   
well-hydrated,  
well nourished.  
EYES conjunctiva and sclera   
normal.  
EARS External ears normal,   
canals clear  
NECK Supple, no adenopathy;   
thyroid symmetric, normal   
size, no bruits  
HEART RRR with normal S1 and   
S2, no murmurs, no gallops, no   
JVD  
appreciated  
LUNG clear to auscultation. No   
wheezes, rhonchi or rales  
EXTREMITIES Extremities   
normal, No deformities, No   
skin discoloration, and  
No edema  
SKIN Skin color, texture,   
turgor normal, no suspicious   
rashes or lesions  
to exposed skin  
Component Latest Ref Rng AND   
Units 2023  
WBC 3.70 - 11.00 k/uL 6.13  
RBC 4.20 - 6.00 m/uL 5.12  
Hemoglobin 13.0 - 17.0 g/dL   
15.3  
Hematocrit 39.0 - 51.0 % 47.2  
MCV 80.0 - 100.0 fL 92.2  
MCH 26.0 - 34.0 pg 29.9  
MCHC 30.5 - 36.0 g/dL 32.4  
RDW-CV 11.5 - 15.0 % 12.9  
Platelet Count 150 - 400 k/uL   
248  
MPV 9.0 - 12.7 fL 10.2  
Neut% % 46.8  
Abs Neut (ANC) 1.45 - 7.50   
k/uL 2.87  
Lymph% % 44.2  
Abs Lymph 1.00 - 4.00 k/uL   
2.71  
Mono% % 5.7  
Abs Mono <0.87 k/uL 0.35  
Eosin% % 2.0  
Abs Eosin <0.46 k/uL 0.12  
Baso% % 1.1  
Abs Baso <0.11 k/uL 0.07  
Immature Gran % % 0.2  
IMMATURE GRANS (ABS) <0.10   
k/uL <0.03  
NRBC /100 WBC 0.0  
Absolute nRBC <0.01 k/uL <0.01  
DTYPE Auto  
Protein, Total 6.3 - 8.0 g/dL   
7.7  
Albumin 3.9 - 4.9 g/dL 4.5  
Calcium 8.5 - 10.2 mg/dL 10.1  
Bilirubin, Total 0.2 - 1.3   
mg/dL 0.4  
Alkaline Phosphatase 38 - 113   
U/L 114 (H)  
AST 14 - 40 U/L 15  
ALT 10 - 54 U/L 6 (L)  
Glucose 74 - 99 mg/dL 96  
BUN 9 - 24 mg/dL 12  
Creatinine 0.73 - 1.22 mg/dL   
1.11  
Sodium 136 - 144 mmol/L 141  
Potassium 3.7 - 5.1 mmol/L 4.2  
Chloride 97 - 105 mmol/L 102  
CO2 22 - 30 mmol/L 28  
Anion Gap 9 - 18 mmol/L 11  
eGFR >=60 mL/min/1.73mA? 80  
Cholesterol, Total <200 (more   
content not included)...                [facility]  
   
                                                    2023 History of Presen  
t   
illness Narrative                       Formatting of this note is   
different from the original.  
2023  
Patient presents with:  
Establish Care  
  
SUBJECTIVE: This is a 53 year   
old, accompanied by mother,   
that is here today for Above   
Complaints.  
  
Follows with neurology, Dr. Renee, every 3-6 months for hx   
of Amirah Chorea. Working   
with PT at this time for   
balance. No recent falls. Per   
mother he does not like to use   
cane or walker  
  
Taking Zoloft for depression.   
Works well to control symptoms  
  
PSA elevated on recent labs.   
Has some urinary incontinence   
at times on his way to the   
restroom. Denies weak stream,   
straining to urinate,   
frequency, dysuria or   
hematuria  
  
Past medical, surgical,   
family, social hx,   
medications, allergies and   
health maintenance reviewed   
and updated  
  
PAST MEDICAL HISTORY  
Diagnosis Date  
Hyperlipidemia  
  
ALLERGIES Patient has no known   
allergies.  
MEDICATIONS  
Current Outpatient Medications  
Medication Sig  
carbidopa-levodopa CR (SINEMET   
CR)  mg per tablet Take   
1 tablet by mouth three times   
daily. (approximately 6-8   
hours apart)  
baclofen 10 mg tablet Take 1   
tablet by mouth three times   
daily.  
memantine (NAMENDA) 10 mg   
tablet Take 1 tablet by mouth   
twice daily.  
levETIRAcetam (KEPPRA) 750 mg   
tablet Take 2 tablets by mouth   
twice daily.  
amantadine HCl (SYMMETREL) 100   
mg capsule Take 1 capsule by   
mouth twice daily.  
sertraline (ZOLOFT) 100 mg   
tablet Take 1 tablet by mouth   
once daily.  
  
No current   
facility-administered   
medications for this visit.  
  
Medications and allergies   
reviewed by this provider.  
SOCIAL HISTORY  
Social History  
  
Tobacco Use  
Smoking status: Former  
Packs/day: 0.00  
Years: 15.00  
Additional pack years: 0.00  
Total pack years: 0.00  
Types: Cigarettes  
Quit date: 2018  
Years since quittin.5  
Smokeless tobacco: Never  
Vaping Use  
Vaping Use: Never used  
Substance Use Topics  
Alcohol use: Never  
Drug use: Never  
  
REVIEW OF SYSTEMS  
GENERAL: No weight loss,   
malaise or fevers  
HEENT: Negative for frequent   
or significant headaches, No   
changes in hearing or vision,   
no nose bleeds or other nasal   
problems  
NECK: Negative for lumps,   
goiter, pain and significant   
neck swelling  
RESPIRATORY: Negative for   
cough, hemoptysis, wheezing,   
COPD, dyspnea or shortness of   
breath  
CARDIOVASCULAR: Negative for   
chest pain, leg swelling,   
hypertension, CHF or   
palpitations  
GI: No nausea, vomiting, or   
diarrhea  
: No history of dysuria,   
frequency. Positive for hx   
urinary incontinence  
MUSCULOSKELETAL: Hx of Hun  
SKIN: Negative for lesions,   
rash, and itching  
PSYCH: Negative for sleep   
disturbance, mood disorder and   
recent psychosocial stressors  
HEMATOLOGY/LYMPHOLOGY:   
Negative for prolonged   
bleeding, bruising easily or   
swollen nodes  
ENDOCRINE: Negative for cold   
or heat intolerance, polyuria,   
polydipsia and goiter  
NEURO: No history of   
headaches, syncope, paralysis,   
seizures  
All other reviewed and   
negative other than HPI.  
  
OBJECTIVE:  
/70   Pulse 71   Resp 18   
  Ht 170.8 cm (5' 7.24 )   Wt   
61.8 kg (136 lb 3.2 oz)   SpO2   
(!) 9%   BMI 21.18 kg/m .   
Vital signs reviewed by this   
provider.  
APPEARANCE Well appearing,   
alert, in no acute distress,   
well-hydrated, well nourished.  
EYES conjunctiva and sclera   
normal.  
EARS External ears normal,   
canals clear  
NECK Supple, no adenopathy;   
thyroid symmetric, normal   
size, no bruits  
HEART RRR with normal S1 and   
S2, no murmurs, no gallops, no   
JVD appreciated  
LUNG clear to auscultation. No   
wheezes, rhonchi or rales  
EXTREMITIES Extremities   
normal, No deformities, No   
skin discoloration, and No   
edema  
SKIN Skin color, texture,   
turgor normal, no suspicious   
rashes or lesions to exposed   
skin  
  
  
Component Latest Ref Rng &   
Units 2023  
WBC 3.70 - 11.00 k/uL 6.13  
RBC 4.20 - 6.00 m/uL 5.12  
Hemoglobin 13.0 - 17.0 g/dL   
15.3  
Hematocrit 39.0 - 51.0 % 47.2  
MCV 80.0 - 100.0 fL 92.2  
MCH 26.0 - 34.0 pg 29.9  
MCHC 30.5 - 36.0 g/dL 32.4  
RDW-CV 11.5 - 15.0 % 12.9  
Platelet Count 150 - 400 k/uL   
248  
MPV 9.0 - 12.7 fL 10.2  
Neut% % 46.8  
Abs Neut (ANC) 1.45 - 7.50   
k/uL 2.87  
Lymph% % 44.2  
Abs Lymph 1.00 - 4.00 k/uL   
2.71  
Mono% % 5.7  
Abs Mono <0.87 k/uL 0.35  
Eosin% % 2.0  
Abs Eosin <0.46 k/uL 0.12  
Baso% % 1.1  
Abs Baso <0.11 k/uL 0.07  
Immature Gran % % 0.2  
IMMATURE GRANS (ABS) <0.10   
k/uL <0.03  
NRBC /100 WBC 0.0  
Absolute nRBC <0.01 k/uL <0.01  
[employer]  
Protein, Total 6.3 - 8.0 g/dL   
7.7  
Albumin 3.9 - 4.9 g/dL 4.5  
Calcium 8.5 - 10.2 mg/dL 10.1  
Bilirubin, Total 0.2 - 1.3   
mg/dL 0.4  
Alkaline Phosphatase 38 - 113   
U/L 114 (H)  
AST 14 - 40 U/L 15  
ALT 10 - 54 U/L 6 (L)  
Glucose 74 - 99 mg/dL 96  
BUN 9 - 24 mg/dL 12  
Creatinine 0.73 - 1.22 mg/dL   
1.11  
Sodium 136 - 144 mmol/L 141  
Potassium 3.7 - 5.1 mmol/L 4.2  
Chloride 97 - 105 mmol/L 102  
CO2 22 - 30 mmol/L 28  
Anion Gap 9 - 18 mmol/L 11  
eGFR >=60 mL/min/1.73m 80  
Cholesterol, Total <200 mg/dL   
220 (H)  
Triglyceride <150 mg/dL 106  
HDL Cholesterol >39 mg/dL 41  
Non HDL Cholesterol <130 mg/dL   
179 (H)  
Fasting Time hrs 12  
VLDL Cholesterol <30 mg/dL 21  
TC:HDL Ratio <5.10 5.37 (H)  
LDL Cholesterol <100 mg/dL 158   
(H)  
LDL:HDL Ratio <2.54 3.85 (H)  
TSH 0.270 - 4.200 mIU/L 1.640  
PSA Screening <2.60 ng/mL 3.09   
(H)  
  
The 10-year ASCVD risk score   
(Terry DK, et al., 2019) is:   
3.9%  
Values used to calculate the   
score:  
Age: 53 years  
Sex: Male  
Is Non-    
American: No  
Diabetic: No  
Tobacco smoker: No  
Systolic Blood Pressure: 102   
mmHg  
Is BP treated: No  
HDL Cholesterol: 41 mg/dL  
Total Cholesterol: 220 mg/dL  
  
  
ASSESSMENT/PLAN:  
1. Encounter to establish care   
- ICD9: V65.8, ICD10: Z76.89   
(primary diagnosis)  
- plan as below  
- follow-up in one year,   
sooner if needed  
  
2. Screening for colon cancer   
- ICD9: V76.51, ICD10: Z12.11  
- COLOGUARD  
  
3. Elevated PSA - ICD9:   
790.93, ICD10: R97.20  
- CONSULT TO UROLOGY  
  
4. Urinary incontinence,   
unspecified type - ICD9:   
788.30, ICD10: R32  
- unable to give urine sample   
in office today  
- no red flag symptoms or exam   
findings  
- red flag symptoms discussed,   
verbalizes understanding  
- URINALYSIS WITH MICROSCOPIC,   
REFLEX CULTURE  
  
5. Amirah's disease (HCC)   
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  
- stable on current medication  
- follow-up with neurology as   
scheduled  
  
6. Depression, recurrent (HCC)   
- ICD9: 296.30, ICD10: F33.9  
- stable at this time  
- follow-up as needed  
  
CHAYITO Eagle.CNP  
  
Prescription instructions   
reviewed with patient as   
applicable. Patient advised if   
symptoms do not improve or if   
symptoms worsen sooner, to   
contact their primary care   
physician. Potential red flag   
symptoms discussed with the   
patient. Reviewed appropriate   
action plan to take if red   
flag symptoms occur. Patient   
agreeable to treatment plan.  
  
I spent a total of 30 minutes   
on the date of the service   
which included preparing to   
see the patient, face-to-face   
patient care, completing   
clinical documentation,   
obtaining and/or reviewing   
separately obtained history,   
performing a medically   
appropriate examination,   
counseling and educating the   
patient/family/caregiver, and   
ordering medications, tests,   
or procedures.  
  
  
Electronically signed by   
Teresa Pineda APRN.CNP at   
2023 9:34 AM EDT  
documented in this encounter            [facility]  
   
                                        2023 Note     HNO ID: [id number]  
Author: Destinee Renee Jr., MD  
Service: ?  
Author Type: Physician  
Type: Progress Notes  
Filed: 2023 12:34 PM  
Note Text:  
ESTABLISHED PATIENT VISIT  
CHIEF COMPLAINT: Follow Up  
HISTORY OF PRESENT ILLNESS:   
Chepe Jenkins is a 52 year old   
male, with a  
PMH significant for and per   
last office visit of 23:  
1. Amirah chorea (HCC) -   
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   
(primary diagnosis)  
2. Spasticity - ICD9: 781.0,   
ICD10: R25.2  
3. Abnormality of gait - ICD9:   
781.2, ICD10: R26.9  
4. Hypophonia - ICD9: 784.49,   
ICD10: R49.8  
5. Dementia associated with   
other underlying disease   
without behavioral  
disturbance (HCC) - ICD9:   
294.10, ICD10: F02.80  
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  
7. Secondary parkinsonism,   
unspecified secondary   
Parkinsonism type (HCC) -  
ICD9: 332.1, ICD10: G21.9  
Patient with decline in   
function since last visit -   
likely both physical  
and mental as well as   
worsening depression. I am   
concerned that sleep  
wake cycle is possibly   
impairing the benefit of his   
medications with pt  
taking at various times of the   
day/night. Particularly his   
Sinemet dosing  
with pt having some   
responsiveness to the use of   
Sinemet in the past.  
With patient not wanting to   
adjust his sleep-wake cycle,   
will attempt to  
change Sinemet dosing so as to   
need less worry about an   
on-off effect. To  
do so will stop Sinement   
25/100mg dosing and change to   
Sinemet CR 50/200mg  
1 tab Q8 hours. Will also   
again refer to PT/OT/speech   
therapy with noted  
declining physical function,   
increased tone in upper   
extremities and  
worsening hypophonia and   
dysarthria. Will not adjust   
other meds at this  
time, with continuation of   
namenda 10mg BID, Keppra 750mg   
BID and  
Amantadine 100mg BID with pt   
reporting no side effects.  
8. Urinary urgency - ICD9:   
788.63, ICD10: R39.15  
Pt in need of a PCP. Uncertain   
cause of urinary urgency - no   
recent check  
ups so could be anything from   
neuro dx to meds to DM to   
prostate  
condition. I have referred pt   
to get established with a PCP.   
Meanwhile  
will order general labs   
including CMP, CBC, TSH, Lipid   
panel as well as  
PSA.  
Pt yet to see PCP. PSA was   
elevated as was cholesterol   
levels.  
No family history of prostate   
ca.  
Speech therapy not yet   
approved. In PT/OT. Patient   
thinks walking better  
but mother not so sure - note   
he just started 1 week ago.  
Sleep schedule still off. They   
are not sure of any changes   
since on the  
Sinemet CR. Taking some time   
to eat meals now. Uncertain as   
to why as  
patient not reporting specific   
symptoms.  
Patient argumentative with   
family - daughter with similar   
symptoms but  
refusing evaluation by   
genetic. States increased   
stressors as yesterday  
daughter having break downs   
requiring police to be at   
their house  
yesterday (recently pink   
slipped).  
Less episodes of choking.  
Later discovered only taking   
Sinemet CR twice daily.  
REVIEW OF SYSTEMS  
GENERAL:No weight loss,   
malaise or fevers.  
HEENT:Negative for frequent or   
significant headaches, No   
changes in  
hearing or vision, no nose   
bleeds or other nasal problems  
NECK:Negative for lumps,   
goiter, pain and significant   
neck swelling  
RESPIRATORY: Negative for   
cough, wheezing or shortness   
of breath.  
CARDIOVASCULAR: Negative for   
chest pain, leg swelling or   
palpitations.  
GASTROINTESTINAL: Negative for   
abdominal discomfort, blood in   
stools or  
black stools or change in   
bowel habits  
GENITOURINARY: No history of   
dysuria, frequency or   
incontinence  
MUSCULOSKELETAL: Negative for   
joint pain or swelling, back   
pain or muscle  
pain.  
NEUROLOGIC:Negative for focal   
numbness or weakness,   
headaches and  
dizziness or syncope, vision   
changes, speech/languag   
changes - EXCEPT that  
as per HPI above.  
SKIN:Negative for lesions,   
rash, and itching.  
PSYCH: reports mood is good.   
No SI or HI.  
LAB/IMAGING: Those performed   
since patient's last visit   
have been  
reviewed.  
WBC (k/uL)  
Date Value  
2023 6.13  
RBC (m/uL)  
Date Value  
2023 5.12  
Hemoglobin (g/dL)  
Date Value  
2023 15.3  
Hematocrit (%)  
Date Value  
2023 47.2  
MCV (fL)  
Date Value  
2023 92.2  
MCH (pg)  
Date Value  
2023 29.9  
MCHC (g/dL)  
Date Value  
2023 32.4  
RDW-CV (%)  
Date Value  
2023 12.9  
Platelet Count (k/uL)  
Date Value  
2023 248  
MPV (fL)  
Date Value  
2023 10.2  
Glucose (mg/dL)  
Date Value  
2023 96  
BUN (mg/dL)  
Date Value  
2023 12  
Creatinine (mg/dL)  
Date Value  
2023 1.11  
Sodium (mmol/L)  
Date Value  
2023 141  
Potassium (mmol/L)  
Date Value  
2023 4.2  
Chloride (mmol/L)  
Date Value  
2023 102  
CO2 (mmol/L)  
Date Value  
2023 28  
Protein, Total (g/dL)  
Date Value  
2023 7.7  
Albumin (g/dL)  
Date Value  
2023 4.5  
Calcium, Total (mg/dL)  
Date Value  
2023 10.1  
Alkaline Phosphatase (U/L)  
Date Value  
2023 114 (H)  
Bilirubin, Total (mg/dL)  
Date Value  
2023 0.4  
AST (U/L)  
Date Value  
2023 15 (more content   
not included)...                        [facility]  
   
                                        2023 Miscellaneous Notes Formattin  
g of this note might   
be different from the   
original.  
LOV 23 with [facility]  
NOV 23 with [facility]  
  
Refill 23 with qty: 90   
and 2 refills  
  
Rossy Reyes LPN  
  
LOV Assessment/Plan  
ASSESSMENT/PLAN:  
1. Amirah chorea (HCC) -   
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   
(primary diagnosis)  
2. Spasticity - ICD9: 781.0,   
ICD10: R25.2  
3. Abnormality of gait - ICD9:   
781.2, ICD10: R26.9  
4. Hypophonia - ICD9: 784.49,   
ICD10: R49.8  
5. Dementia associated with   
other underlying disease   
without behavioral disturbance   
(HCC) - ICD9: 294.10, ICD10:   
F02.80  
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  
7. Secondary parkinsonism,   
unspecified secondary   
Parkinsonism type (HCC) -   
ICD9: 332.1, ICD10: G21.9   
Patient with decline in   
function since last visit -   
likely both physical and   
mental as well as worsening   
depression. I am concerned   
that sleep wake cycle is   
possibly impairing the benefit   
of his medications with pt   
taking at various times of the   
day/night. Particularly his   
Sinemet dosing with pt having   
some responsiveness to the use   
of Sinemet in the past. With   
patient not wanting to adjust   
his sleep-wake cycle, will   
attempt to change Sinemet   
dosing so as to need less   
worry about an on-off effect.   
To do so will stop Sinement   
25/100mg dosing and change to   
Sinemet CR 50/200mg 1 tab Q8   
hours. Will also again refer   
to PT/OT/speech therapy with   
noted declining physical   
function, increased tone in   
upper extremities and   
worsening hypophonia and   
dysarthria. Will not adjust   
other meds at this time, with   
continuation of namenda 10mg   
BID, Keppra 750mg BID and   
Amantadine 100mg BID with pt   
reporting no side effects.  
  
8. Urinary urgency - ICD9:   
788.63, ICD10: R39.15  
Pt in need of a PCP. Uncertain   
cause of urinary urgency - no   
recent check ups so could be   
anything from neuro dx to meds   
to DM to prostate condition. I   
have referred pt to get   
established with a PCP.   
Meanwhile will order general   
labs including CMP, CBC, TSH,   
Lipid panel as well as PSA.  
  
  
Destinee Renee MD  
  
Electronically signed by   
Rossy Reyes LPN at   
2023 11:51 AM EDT  
documented in this encounter            [facility]  
   
                                        2023 Miscellaneous Notes Formattin  
g of this note might   
be different from the   
original.  
Pt forms completed and faxed   
back to number provided.   
Orders sent to chart for   
scanning.  
  
Rossy Reyes LPN  
  
Electronically signed by   
Rossy Reyes LPN at   
2023 8:36 AM EDT  
Formatting of this note might   
be different from the   
original.  
TC from [facility],   
gave her message below. She is   
agreeable and they will not be   
picking up pt for speech   
therapy.  
  
Rossy Reyes LPN  
  
Electronically signed by   
Rossy Reyes LPN at   
2023 3:15 PM EDT  
Formatting of this note might   
be different from the   
original.  
Received forms of PT through   
fax.  
  
Type of form: Physical Therapy   
Orders  
  
Form received via fax  
  
When form is completed, Fax   
form to [facility]  
  
Form has been forwarded to   
Physician Desk:  
NATIVIDAD Schaefer  
  
Electronically signed by   
NATIVIDAD Meek at   
2023 8:30 AM EDT  
Formatting of this note might   
be different from the   
original.  
Attempted to call Mis 2x   
with no answer. Phone rang   
busy. Will try again.  
  
Rossy Reyes LPN  
  
Electronically signed by   
Rossy Reyes LPN at   
2023 10:46 AM EDT  
Formatting of this note might   
be different from the   
original.  
Patient instructed in past on   
need to continue with   
exercises at home. If patient   
non-compliant defer opinion of   
d/c therapy to the therapists,   
but clearly will not have long   
term benefit if not following   
with instructions and   
recommendations.  
  
Destinee Renee MD  
  
Electronically signed by   
Destinee Renee Jr., MD at   
2023 10:34 AM EDT  
Formatting of this note might   
be different from the   
original.  
Mis from speech therapy   
called. Pt has been seen   
several times over the years   
and just participates and then   
does not carry it over at   
home. Wants to know your view   
point on this and if it okay   
if they don't pick him up this   
time around. Is requesting to   
talk to you specifically.  
  
Mis [phone]  
Available:  
5:00 pm today  
9-9:30 am Wednesday  
  
Rossy Reyes LPN  
  
Electronically signed by   
Rossy Reyes LPN at   
2023 4:25 PM EDT  
documented in this encounter            [facility]  
   
                                        2023 Note     HNO ID: [id number]  
Author: Destinee Renee Jr., MD  
Service: ?  
Author Type: Physician  
Type: Progress Notes  
Filed: 2023 5:38 PM  
Note Text:  
ESTABLISHED PATIENT VISIT  
CHIEF COMPLAINT: Follow Up  
HISTORY OF PRESENT ILLNESS:   
Chepe Jenkins is a 52 year old   
male, BMI 19.08  
kg/m2 with a PMH significant   
for and per last office visit   
of 10/10/22:  
1. Glacier disease (HCC) -   
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   
(primary diagnosis)  
2. Dementia associated with   
other underlying disease   
without behavioral  
disturbance (HCC) - ICD9:   
294.10, ICD10: F02.80  
3. Spasticity - ICD9: 781.0,   
ICD10: R25.2  
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  
5. Hypophonia - ICD9: 784.49,   
ICD10: R49.8  
6. Dysphagia, unspecified type   
- ICD9: 787.20, ICD10: R13.10  
7. Abnormality of gait - ICD9:   
781.2, ICD10: R26.9  
Overall patient doing well,   
with exam today improved since   
last visit.  
Both pt and his mother who   
accompanies him feel no need   
for changes in  
meds at this time. Physically   
and from standpoint of mood,   
exam findings  
today iproved from last visit   
with no chorea or other   
abnormal movements.  
They would like to enroll   
again in speech therapy as   
they feel it did  
improve both speech and   
swallowing but also feel that   
the longer out from  
it they are, that symptoms   
again begin to worsen. Rx   
provided. Refills  
for meds provided. Follow up   
in 6 months.  
Patient reports no issues.   
Patient feels no decline since   
last visit.  
Pt's mother states he was   
never approved for therapy.   
Mother feels there  
has been decline in function -   
more slow, more stiff. No   
falls. Pt still  
independent in terms of most   
daily activities, but he has   
been having  
accidents (urinary urgency).   
Pt without a PCP. Pt does not   
feel mood is  
worsening. Issues with his   
children (in their 20's) --   
they have never  
attended an office visit).   
Patient feels kids should   
still visit him and  
looks the children as  little   
kids . Currently with reversed   
circadian  
sleep-wake pattern. Pt not   
always taking meds as he is   
supposed to.  
Limited exercise and not able   
to get outside due to weather.  
REVIEW OF SYSTEMS  
GENERAL:No weight loss,   
malaise or fevers.  
HEENT:Negative for frequent or   
significant headaches, No   
changes in  
hearing or vision, no nose   
bleeds or other nasal problems  
NECK:Negative for lumps,   
goiter, pain and significant   
neck swelling  
RESPIRATORY: Negative for   
cough, wheezing or shortness   
of breath.  
CARDIOVASCULAR: Negative for   
chest pain, leg swelling or   
palpitations.  
GASTROINTESTINAL: Negative for   
abdominal discomfort, blood in   
stools or  
black stools or change in   
bowel habits  
GENITOURINARY: See HPI.  
MUSCULOSKELETAL: Negative for   
joint pain or swelling, back   
pain or muscle  
pain.  
NEUROLOGIC:See HPI.  
LAB/IMAGING: Those performed   
since patient's last visit   
have been  
reviewed.  
WBC (k/uL)  
Date Value  
2021 8.57  
RBC (m/uL)  
Date Value  
2021 4.92  
Hemoglobin (g/dL)  
Date Value  
2021 14.7  
Hematocrit (%)  
Date Value  
2021 44.8  
MCV (fL)  
Date Value  
2021 91.1  
MCH (pG)  
Date Value  
2021 29.9  
MCHC (g/dL)  
Date Value  
2021 32.8  
RDW-CV (%)  
Date Value  
2021 13.0  
Platelet Count (k/uL)  
Date Value  
2021 295  
MPV (fL)  
Date Value  
2021 10.3  
Glucose (mg/dL)  
Date Value  
2021 90  
BUN (mg/dL)  
Date Value  
2021 11  
Creatinine (mg/dL)  
Date Value  
2021 0.99  
Sodium (mmol/L)  
Date Value  
2021 137  
Potassium (mmol/L)  
Date Value  
2021 3.9  
Chloride (mmol/L)  
Date Value  
2021 102  
CO2 (mmol/L)  
Date Value  
2021 23  
Protein, Total (g/dL)  
Date Value  
2021 7.5  
Albumin (g/dL)  
Date Value  
2021 4.4  
Calcium (mg/dL)  
Date Value  
2021 9.7  
Alkaline Phosphatase (U/L)  
Date Value  
2021 110  
Bilirubin, Total (mg/dL)  
Date Value  
2021 0.3  
AST (U/L)  
Date Value  
2021 22  
ALT (U/L)  
Date Value  
2021 10  
MEDICATIONS:  
amantadine HCl (SYMMETREL) 100   
mg capsule Take 1 capsule by   
mouth twice  
daily.  
levETIRAcetam (KEPPRA) 750 mg   
tablet Take 2 tablets by mouth   
twice daily.  
memantine (NAMENDA) 10 mg   
tablet Take 1 tablet by mouth   
twice daily.  
carbidopa-levodopa (SINEMET   
)  mg per tablet   
TAKE 1 TABLET BY  
MOUTH TWICE DAILY AT 8AM AND   
1PM (AFTER LUNCH)  
baclofen (LIORESAL) 10 mg   
tablet Take 1 tablet by mouth   
three times  
daily.  
sertraline (ZOLOFT) 100 mg   
tablet Take 1 tablet by mouth   
once daily.  
HISTORIES  
PAST MEDICAL HISTORY  
Diagnosis Date  
Hyperlipidemia  
FAMILY HISTORY  
Problem Relation Age of Onset  
other (amirah disease)   
Other  
grandmother, aunt, cousin  
other (suicide) Other  
father, uncle.  
SOCIAL HISTORY  
Social History  
Tobacco Use  
Smoking status: Former  
Packs/day: 0.00  
Years: 15.00  
Pack years: 0.00  
Types: Cigarettes  
Quit date: 2018  
Years since quittin.2  
Smokeless tobacco: Never  
Vaping Use  
Vaping Use: Never used  
Substance Use Topics  
Alcohol use (more content not   
included)...                            [facility]  
   
                                        2023 Miscellaneous Notes Formattin  
g of this note might   
be different from the   
original.  
LOV: 10/10/22 with [facility]  
NOV: 23 with [facility]  
  
Refill 10/10/22 qty: 180 and 0   
refills  
Pari Angelo LPN  
  
ASSESSMENT/PLAN  
1. Glacier disease (HCC) -   
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   
(primary diagnosis)  
2. Dementia associated with   
other underlying disease   
without behavioral disturbance   
(HCC) - ICD9: 294.10, ICD10:   
F02.80  
3. Spasticity - ICD9: 781.0,   
ICD10: R25.2  
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  
5. Hypophonia - ICD9: 784.49,   
ICD10: R49.8  
6. Dysphagia, unspecified type   
- ICD9: 787.20, ICD10: R13.10  
7. Abnormality of gait - ICD9:   
781.2, ICD10: R26.9  
  
Overall patient doing well,   
with exam today improved since   
last visit. Both pt and his   
mother who accompanies him   
feel no need for changes in   
meds at this time. Physically   
and from standpoint of mood,   
exam findings today iproved   
from last visit with no chorea   
or other abnormal movements.   
They would like to enroll   
again in speech therapy as   
they feel it did improve both   
speech and swallowing but also   
feel that the longer out from   
it they are, that symptoms   
again begin to worsen. Rx   
provided. Refills for meds   
provided. Follow up in 6   
months.  
  
Destinee Renee MD  
  
Electronically signed by   
Pari Angelo LPN at   
2023 8:22 AM EDT  
documented in this encounter            [facility]  
   
                                        2023 Miscellaneous Notes Formattin  
g of this note might   
be different from the   
original.  
The patient is scheduled for   
an appointment on Monday,   
April 10.  
  
The provider will be out of   
the office ,and we need to   
reschedule the appointment.  
  
I left a voice message for the   
patient to call the office to   
reschedule.  
Electronically signed by Nata Peace at 2023   
10:01 AM EST  
documented in this encounter            [facility]  
   
                                        2022 Nurse Note Formatting of this  
 note might   
be different from the   
original.  
Items addressed in this   
encounter:  
Other  
VV LVM  
  
Suzanne Das RN  
2022 8:33 AM  
  
8:33 AM  
  
  
Electronically signed by   
Suzanne Das RN at 2022   
8:33 AM EST  
documented in this encounter            [facility]  
   
                                        2022 Miscellaneous Notes Addended   
by: DESTINEE RENEE   
on: 2022 10:10 AM  
  
Modules accepted: Orders  
  
  
Electronically signed by   
Destinee Renee Jr., MD at   
2022 10:10 AM EST  
Formatting of this note might   
be different from the   
original.  
LOV: 10/10/22  
NOV: 4/10/23  
  
Current physical therapy order   
on file. The patient notified   
by [employer] message.  
  
Assessment and Plan:  
ASSESSMENT/PLAN:  
1. Amirah disease (HCC) -   
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   
(primary diagnosis)  
2. Dementia associated with   
other underlying disease   
without behavioral disturbance   
(HCC) - ICD9: 294.10, ICD10:   
F02.80  
3. Spasticity - ICD9: 781.0,   
ICD10: R25.2  
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  
5. Hypophonia - ICD9: 784.49,   
ICD10: R49.8  
6. Dysphagia, unspecified type   
- ICD9: 787.20, ICD10: R13.10  
7. Abnormality of gait - ICD9:   
781.2, ICD10: R26.9  
  
Overall patient doing well,   
with exam today improved since   
last visit. Both pt and his   
mother who accompanies him   
feel no need for changes in   
meds at this time. Physically   
and from standpoint of mood,   
exam findings today iproved   
from last visit with no chorea   
or other abnormal movements.   
They would like to enroll   
again in speech therapy as   
they feel it did improve both   
speech and swallowing but also   
feel that the longer out from   
it they are, that symptoms   
again begin to worsen. Rx   
provided. Refills for meds   
provided. Follow up in 6   
months.  
  
Destinee Renee MD  
Electronically signed by Nata Herrera Pss at 2022   
8:33 AM EST  
documented in this encounter            [facility]  
   
                                        10- Note     HNO ID: [id number]  
Author: Destinee Renee Jr., MD  
Service: ?  
Author Type: Physician  
Type: Progress Notes  
Filed: 10/11/2022 6:19 AM  
Note Text:  
ESTABLISHED PATIENT VISIT  
CHIEF COMPLAINT: Follow Up  
HISTORY OF PRESENT ILLNESS:   
Chepe Jenkins is a 52 year old   
male, BMI 20.53  
kg/m2 with a PMH significant   
for and per last office visit   
of 6/10/22:  
1. Amirah disease (HCC) -   
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   
(primary diagnosis)  
2. Abnormality of gait - ICD9:   
781.2, ICD10: R26.9  
3. Dementia associated with   
other underlying disease   
without behavioral  
disturbance (HCC) - ICD9:   
294.10, ICD10: F02.80  
4. Spasticity - ICD9: 781.0,   
ICD10: R25.2  
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  
Overall patient's condition is   
stable with exception of   
dysphagia which it  
is unclear if worsening or   
patient not following speech   
recommendations.  
Chorea much improved since on   
sinemet 25/100mg BID. He would   
prefer to  
not make any changes to meds   
at this time (Sinemet,   
Amantadine, Keppra all  
as above). In addition, with   
history of depression will   
continue Zoloft.  
Will need updated labs next   
visit including CMP and CBC.   
Encouraged  
physical activity.  
6. Dysphagia, unspecified type   
- ICD9: 787.20, ICD10: R13.10  
Will refer again to speech   
therapy for further evaluation   
and  
recommendations. Also with   
weight loss and need to adjust   
diet based on  
speech therapy   
recommendations, will refer to   
dietary.  
Patient more talkative today.   
Was doing therapy, but has   
been out for  
about 1 month. Mother feels   
that swallowing improved. Pt   
feels movements  
stable. No falls. No   
hyperactive movements. No   
longer dropping plates.  
Patient not continuing therapy   
at home. No cognitive decline.   
Weight  
stable over the past year.  
REVIEW OF SYSTEMS  
GENERAL:No weight loss,   
malaise or fevers.  
HEENT:Negative for frequent or   
significant headaches, No   
changes in  
hearing or vision, no nose   
bleeds or other nasal problems  
NECK:Negative for lumps,   
goiter, pain and significant   
neck swelling  
RESPIRATORY: Negative for   
cough, wheezing or shortness   
of breath.  
CARDIOVASCULAR: Negative for   
chest pain, leg swelling or   
palpitations.  
GASTROINTESTINAL: Negative for   
abdominal discomfort, blood in   
stools or  
black stools or change in   
bowel habits  
GENITOURINARY: No history of   
dysuria, frequency or   
incontinence  
MUSCULOSKELETAL: Negative for   
joint pain or swelling, back   
pain or muscle  
pain.  
NEUROLOGIC:See HPI.  
SKIN:Negative for lesions,   
rash, and itching.  
PSYCHIATRIC:  My mood is good   
LAB/IMAGING: Those performed   
since patient's last visit   
have been  
reviewed.  
WBC (k/uL)  
Date Value  
2021 8.57  
RBC (m/uL)  
Date Value  
2021 4.92  
Hemoglobin (g/dL)  
Date Value  
2021 14.7  
Hematocrit (%)  
Date Value  
2021 44.8  
MCV (fL)  
Date Value  
2021 91.1  
MCH (pG)  
Date Value  
2021 29.9  
MCHC (g/dL)  
Date Value  
2021 32.8  
RDW-CV (%)  
Date Value  
2021 13.0  
Platelet Count (k/uL)  
Date Value  
2021 295  
MPV (fL)  
Date Value  
2021 10.3  
Glucose (mg/dL)  
Date Value  
2021 90  
BUN (mg/dL)  
Date Value  
2021 11  
Creatinine (mg/dL)  
Date Value  
2021 0.99  
Sodium (mmol/L)  
Date Value  
2021 137  
Potassium (mmol/L)  
Date Value  
2021 3.9  
Chloride (mmol/L)  
Date Value  
2021 102  
CO2 (mmol/L)  
Date Value  
2021 23  
Protein, Total (g/dL)  
Date Value  
2021 7.5  
Albumin (g/dL)  
Date Value  
2021 4.4  
Calcium (mg/dL)  
Date Value  
2021 9.7  
Alkaline Phosphatase (U/L)  
Date Value  
2021 110  
Bilirubin, Total (mg/dL)  
Date Value  
2021 0.3  
AST (U/L)  
Date Value  
2021 22  
ALT (U/L)  
Date Value  
2021 10  
MEDICATIONS:  
amantadine HCl (SYMMETREL) 100   
mg capsule Take 1 capsule by   
mouth twice  
daily  
carbidopa-levodopa (SINEMET   
)  mg per tablet   
TAKE 1 TABLET BY  
MOUTH TWICE DAILY AT 8AM AND   
1PM (AFTER LUNCH)  
memantine (NAMENDA) 10 mg   
tablet Take 1 tablet by mouth   
twice daily.  
levETIRAcetam (KEPPRA) 750 mg   
tablet Take 2 tablets by mouth   
twice daily.  
baclofen (LIORESAL) 10 mg   
tablet Take 1 tablet by mouth   
three times  
daily.  
sertraline (ZOLOFT) 100 mg   
tablet Take 1 tablet by mouth   
once daily.  
HISTORIES  
PAST MEDICAL HISTORY  
Diagnosis Date  
Hyperlipidemia  
FAMILY HISTORY  
Problem Relation Age of Onset  
other (amirah disease)   
Other  
grandmother, aunt, cousin  
other (suicide) Other  
father, uncle.  
SOCIAL HISTORY  
Social History  
Tobacco Use  
Smoking status: Former  
Packs/day: 0.00  
Years: 15.00  
Pack years: 0.00  
Types: Cigarettes  
Quit date: 2018  
Years since quittin.6  
Smokeless tobacco: Never  
Vaping Use  
Vaping Use: Never used  
Substance Use Topics  
Alcohol use: Never  
Drug use: Never  
PHYSICAL EXAMINATION  
/90   Pulse 95   Resp 24   
  Wt 63 kg (139 lb)   SpO2 98%   
  BMI  
20.53 kg/m?  
GENERAL EXAM:  
General appearance: NAD,   
pleasant.  
HEENT: NC/AT, nasal congestion   
absent, (more content not   
included)...                            [facility]  
   
                                                    10- History of Presen  
t   
illness Narrative                       Formatting of this note is   
different from the original.  
ESTABLISHED PATIENT VISIT  
  
CHIEF COMPLAINT: Follow Up  
  
HISTORY OF PRESENT ILLNESS:   
Chpee Jenkins is a 52 year old   
male, BMI 20.53 kg/m2 with a   
PMH significant for and per   
last office visit of 6/10/22:  
  
1. Amirah disease (HCC) -   
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   
(primary diagnosis)  
2. Abnormality of gait - ICD9:   
781.2, ICD10: R26.9  
3. Dementia associated with   
other underlying disease   
without behavioral disturbance   
(HCC) - ICD9: 294.10, ICD10:   
F02.80  
4. Spasticity - ICD9: 781.0,   
ICD10: R25.2  
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  
Overall patient's condition is   
stable with exception of   
dysphagia which it is unclear   
if worsening or patient not   
following speech   
recommendations. Chorea much   
improved since on sinemet   
25/100mg BID. He would prefer   
to not make any changes to   
meds at this time (Sinemet,   
Amantadine, Keppra all as   
above). In addition, with   
history of depression will   
continue Zoloft. Will need   
updated labs next visit   
including CMP and CBC.   
Encouraged physical activity.  
  
6. Dysphagia, unspecified type   
- ICD9: 787.20, ICD10: R13.10  
Will refer again to speech   
therapy for further evaluation   
and recommendations. Also with   
weight loss and need to adjust   
diet based on speech therapy   
recommendations, will refer to   
dietary.  
  
Patient more talkative today.   
Was doing therapy, but has   
been out for about 1 month.   
Mother feels that swallowing   
improved. Pt feels movements   
stable. No falls. No   
hyperactive movements. No   
longer dropping plates.   
Patient not continuing therapy   
at home. No cognitive decline.   
Weight stable over the past   
year.  
  
REVIEW OF SYSTEMS  
GENERAL:No weight loss,   
malaise or fevers.  
HEENT:Negative for frequent or   
significant headaches, No   
changes in hearing or vision,   
no nose bleeds or other nasal   
problems  
NECK:Negative for lumps,   
goiter, pain and significant   
neck swelling  
RESPIRATORY: Negative for   
cough, wheezing or shortness   
of breath.  
CARDIOVASCULAR: Negative for   
chest pain, leg swelling or   
palpitations.  
GASTROINTESTINAL: Negative for   
abdominal discomfort, blood in   
stools or black stools or   
change in bowel habits  
GENITOURINARY: No history of   
dysuria, frequency or   
incontinence  
MUSCULOSKELETAL: Negative for   
joint pain or swelling, back   
pain or muscle pain.  
NEUROLOGIC:See HPI.  
SKIN:Negative for lesions,   
rash, and itching.  
PSYCHIATRIC:  My mood is good   
  
LAB/IMAGING: Those performed   
since patient's last visit   
have been reviewed.  
WBC (k/uL)  
Date Value  
2021 8.57  
  
RBC (m/uL)  
Date Value  
2021 4.92  
  
Hemoglobin (g/dL)  
Date Value  
2021 14.7  
  
Hematocrit (%)  
Date Value  
2021 44.8  
  
MCV (fL)  
Date Value  
2021 91.1  
  
MCH (pG)  
Date Value  
2021 29.9  
  
MCHC (g/dL)  
Date Value  
2021 32.8  
  
RDW-CV (%)  
Date Value  
2021 13.0  
  
Platelet Count (k/uL)  
Date Value  
2021 295  
  
MPV (fL)  
Date Value  
2021 10.3  
  
Glucose (mg/dL)  
Date Value  
2021 90  
  
BUN (mg/dL)  
Date Value  
2021 11  
  
Creatinine (mg/dL)  
Date Value  
2021 0.99  
  
Sodium (mmol/L)  
Date Value  
2021 137  
  
Potassium (mmol/L)  
Date Value  
2021 3.9  
  
Chloride (mmol/L)  
Date Value  
2021 102  
  
CO2 (mmol/L)  
Date Value  
2021 23  
  
Protein, Total (g/dL)  
Date Value  
2021 7.5  
  
Albumin (g/dL)  
Date Value  
2021 4.4  
  
Calcium (mg/dL)  
Date Value  
2021 9.7  
  
Alkaline Phosphatase (U/L)  
Date Value  
2021 110  
  
Bilirubin, Total (mg/dL)  
Date Value  
2021 0.3  
  
AST (U/L)  
Date Value  
2021 22  
  
ALT (U/L)  
Date Value  
2021 10  
  
MEDICATIONS:  
amantadine HCl (SYMMETREL) 100   
mg capsule Take 1 capsule by   
mouth twice daily  
carbidopa-levodopa (SINEMET   
)  mg per tablet   
TAKE 1 TABLET BY MOUTH TWICE   
DAILY AT 8AM AND 1PM (AFTER   
LUNCH)  
memantine (NAMENDA) 10 mg   
tablet Take 1 tablet by mouth   
twice daily.  
levETIRAcetam (KEPPRA) 750 mg   
tablet Take 2 tablets by mouth   
twice daily.  
baclofen (LIORESAL) 10 mg   
tablet Take 1 tablet by mouth   
three times daily.  
sertraline (ZOLOFT) 100 mg   
tablet Take 1 tablet by mouth   
once daily.  
  
HISTORIES  
PAST MEDICAL HISTORY  
Diagnosis Date  
Hyperlipidemia  
  
FAMILY HISTORY  
Problem Relation Age of Onset  
other (amirah disease)   
Other  
grandmother, aunt, cousin  
other (suicide) Other  
father, uncle.  
  
SOCIAL HISTORY  
Social History  
  
Tobacco Use  
Smoking status: Former  
Packs/day: 0.00  
Years: 15.00  
Pack years: 0.00  
Types: Cigarettes  
Quit date: 2018  
Years since quittin.6  
Smokeless tobacco: Never  
Vaping Use  
Vaping Use: Never used  
Substance Use Topics  
Alcohol use: Never  
Drug use: Never  
  
PHYSICAL EXAMINATION  
/90   Pulse 95   Resp 24   
  Wt 63 kg (139 lb)   SpO2 98%   
  BMI 20.53 kg/m  
  
GENERAL EXAM:  
General appearance: NAD,   
pleasant.  
HEENT: NC/AT, nasal congestion   
absent, no oral lesions,   
membranes moist.  
NECK: ROM nml.  
Lungs: CTA bilaterally.  
CV: RRR nl S1, S2  
Extr: No cyanosis, clubbing or   
edema.  
Skin: Cool to touch.  
  
NEUROLOGICAL EXAM:  
General: Awake, alert,   
oriented x3   
(person,place,time), speech   
and language stable;   
comprehension, naming,   
repetition intact.  
CN: PERRL, EOMI and without   
nystagmus, VFF to   
confrontation, facial   
sensation and strength are   
normal and symmetric, hearing   
is intact, palate and tongue   
movements are intact and   
symmetric. SCM and trapezius   
strength normal.  
Motor: Increased tone   
throughout but strength 5/5 x4   
exts.  
Coordination: FNF, YONIS, HTS   
intact. Rare involuntary   
movements during testing.  
Sensation: Light touch intact   
throughout. No evidence of   
neglect.  
Gait: Stable today although   
somewhat off balance if   
turning too fast.  
  
Assessment and Plan:  
ASSESSMENT/PLAN:  
1. Glacier disease (HCC) -   
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   
(primary diagnosis)  
2. Dementia associated with   
other underlying disease   
without behavioral disturbance   
(HCC) - ICD9: 294.10, ICD10:   
F02.80  
3. Spasticity - ICD9: 781.0,   
ICD10: R25.2  
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  
5. Hypophonia - ICD9: 784.49,   
ICD10: R49.8  
6. Dysphagia, unspecified type   
- ICD9: 787.20, ICD10: R13.10  
7. Abnormality of gait - ICD9:   
781.2, ICD10: R26.9  
  
Overall patient doing well,   
with exam today improved since   
last visit. Both pt and his   
mother who accompanies him   
feel no need for changes in   
meds at this time. Physically   
and from standpoint of mood,   
exam findings today iproved   
from last visit with no chorea   
or other abnormal movements.   
They would like to enroll   
again in speech therapy as   
they feel it did improve both   
speech and swallowing but also   
feel that the longer out from   
it they are, that symptoms   
again begin to worsen. Rx   
provided. Refills for meds   
provided. Follow up in 6   
months.  
  
Destinee Renee MD  
  
I spent a total of 25 minutes   
on the date of the service   
which included preparing to   
see the patient, face-to-face   
patient care, completing   
clinical documentation,   
obtaining and/or reviewing   
separately obtained history,   
performing a medically   
appropriate examination,   
counseling and educating the   
patient/family/caregiver,   
ordering medications, tests,   
or procedures, and   
communicating results to the   
patient/family/caregiver.  
  
  
Electronically signed by   
Destinee Renee Jr., MD at   
10/11/2022 6:19 AM EDT  
documented in this encounter            [facility]  
   
                                        2022 Miscellaneous Notes Formattin  
g of this note might   
be different from the   
original.  
Refill currently being viewed   
in refill encounter from .   
Pt updated through  that   
provider is aware. KOMAL Meek  
Electronically signed by   
KOMAL Meek at   
2022 10:40 AM EDT  
documented in this encounter            [facility]  
   
                                        2022 Miscellaneous Notes Formattin  
g of this note is   
different from the original.  
Last office visit: 06/10/22  
Next appointment scheduled:   
10/10/22  
  
Pharmacy calls in requesting   
the following refill(s):  
Requested Prescriptions  
  
Pending Prescriptions Disp   
Refills  
amantadine HCl (SYMMETREL) 100   
mg capsule [Pharmacy Med Name:   
Amantadine HCl 100 MG Oral   
Capsule] 180 capsule 0  
Sig: Take 1 capsule by mouth   
twice daily  
  
Assessment and Plan:  
ASSESSMENT/PLAN:  
1. Glacier disease (HCC) -   
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   
(primary diagnosis)  
2. Abnormality of gait - ICD9:   
781.2, ICD10: R26.9  
3. Dementia associated with   
other underlying disease   
without behavioral disturbance   
(HCC) - ICD9: 294.10, ICD10:   
F02.80  
4. Spasticity - ICD9: 781.0,   
ICD10: R25.2  
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  
Overall patient's condition is   
stable with exception of   
dysphagia which it is unclear   
if worsening or patient not   
following speech   
recommendations. Chorea much   
improved since on sinemet   
25/100mg BID. He would prefer   
to not make any changes to   
meds at this time (Sinemet,   
Amantadine, Keppra all as   
above). In addition, with   
history of depression will   
continue Zoloft. Will need   
updated labs next visit   
including CMP and CBC.   
Encouraged physical activity.  
  
6. Dysphagia, unspecified type   
- ICD9: 787.20, ICD10: R13.10  
Will refer again to speech   
therapy for further evaluation   
and recommendations. Also with   
weight loss and need to adjust   
diet based on speech therapy   
recommendations, will refer to   
dietary.  
  
Destinee Renee MD  
Electronically signed by Lacey Downey LPN at 2022 2:45   
PM EDT  
documented in this encounter            [facility]  
   
                                        2022 Miscellaneous Notes   
  
  
Formatting of this note is   
different from the original.  
Physician: Dr. Renee  
Call from pharmacy requesting   
refill. Please E-Scribe  
  
Last OV: 6/10/22 with Dr. Renee  
Future OV: 10/10/22 with Dr. Renee  
  
  
Pending Prescriptions Disp   
Refills  
AMANTADINE  MG CAPSULE   
180 capsule 0  
Sig: Take 1 capsule by mouth   
twice daily  
KM: Yes  
  
Pharmacy Name: [facility]  
Pharmacy Phone #: [phone]  
  
Nata Herrera  
  
6/10/22  
  
  
Assessment and Plan:  
ASSESSMENT/PLAN:  
1. Glacier disease (HCC) -   
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   
(primary diagnosis)  
2. Abnormality of gait - ICD9:   
781.2, ICD10: R26.9  
3. Dementia associated with   
other underlying disease   
without behavioral disturbance   
(HCC) - ICD9: 294.10, ICD10:   
F02.80  
4. Spasticity - ICD9: 781.0,   
ICD10: R25.2  
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  
Overall patient's condition is   
stable with exception of   
dysphagia which it is unclear   
if worsening or patient not   
following speech   
recommendations. Chorea much   
improved since on sinemet   
25/100mg BID. He would prefer   
to not make any changes to   
meds at this time (Sinemet,   
Amantadine, Keppra all as   
above). In addition, with   
history of depression will   
continue Zoloft. Will need   
updated labs next visit   
including CMP and CBC.   
Encouraged physical activity.  
  
  
Electronically signed by Nata Herrera Pss at 2022   
2:13 PM EDTdocumented in this   
encounter                               [facility]  
   
                                        2022 Miscellaneous Notes   
  
  
Formatting of this note is   
different from the original.  
Physician: Dr. Renee  
Call from pharmacy requesting   
refill. Please E-Scribe  
  
Last OV: 21 with Dr. Renee  
Future OV: 6/10/22 with Dr. Renee  
  
  
Pending Prescriptions Disp   
Refills  
CARBIDOPA 25 MG-LEVODOPA 100   
MG TABLET 60 tablet 0  
Sig: TAKE 1 TABLET BY MOUTH   
TWICE DAILY AT 8AM AND 1PM   
(AFTER LUNCH)  
KM: No  
  
Pharmacy Name: [facility]  
Pharmacy Phone #: [phone]  
  
Nata Herrera  
  
  
Electronically signed by Nata Herrera Pss at 2022   
2:50 PM EDTdocumented in this   
encounter                               [facility]  
   
                                        2022 Melissa Ford - 2022   
9:31 AM EDT  
  
  
Formatting of this note is   
different from the original.  
  
[facility]  
  
ATTENTION  
  
Clinical Depression  
What you need to know and what   
you can do.  
  
Prior to your visit today, you   
completed a computerized   
survey.  
  
Your responses to some of the   
questions indicate you could   
be suffering from  
Clinical Depression.  
  
The typical symptoms of   
Clinical Depression are listed   
in the box below  
  
You checked off some or all of   
the symptoms, and indicated   
that they occur often enough   
to be bother-some.  
  
If this is accurate, then it   
is strongly recommended you   
get further professional help,   
especially if you feel   
hopeless or that life is not   
worth living (see (1) under   
Get Help).  
  
You may want to discuss this   
with a trusted friend or   
family member.  
  
Typical symptoms of Clinical   
Depression :  
Bad mood  
Trouble enjoying things you   
normally like to do  
Difficulty falling or staying   
asleep  
Feeling unusually bad about   
yourself (overly   
self-critical, worthless or   
guilty)  
Lack of usual energy level  
Lack of enthusiasm,   
motivation, or drive  
Trouble concentrating or   
making decisions (it can feel   
like memory loss)  
Loss of appetite (or sometimes   
over-eating)  
Being agitated, restless (or   
sluggish and slowed down)  
Feeling life just is not worth   
living or that you would be   
better off dead.  
  
GET HELP  
  
If you have symptoms of   
clinical depression, you can   
get help by doing one or more   
of the following:  
  
(1) Call the National Suicide   
Hotline  
1-513.562.7994 or Call 040  
  
(2) If you are already in   
treatment, make sure you   
contact and update your doctor   
or therapist.  
  
(3) Call the [facility]   
Department of Psychiatry &   
Psychology at [phone]   
or [phone] (ask for   
Psychiatry & Psychology   
appointments)  
  
(4) Call your primary care   
doctor to set up an   
appointment.  
  
(5) Call to set-up an   
appointment at one of the   
mental healthcare facilities   
in the [facility]   
(telephone numbers listed   
below).  
  
Mental Health Resources in the   
[facility] (in [place]   
unless otherwise noted):  
  
[facility] [phone]  
[facility] - [phone]   
[address] [facility] [phone]  
[facility] Brennan - [address] [address]   
. [phone]  
[facility] - [address] [phone]  
[facility] - [address]   
[phone]  
[facility] [address]   
[phone]  
[facility] [address] [facility] [address] [facility]   
[facility] - [phone]  
[facility] -   
[phone]  
[facility] -   
[address] [place]   
[phone]  
[facility] [address] [place]   
[phone]  
[facility] - [address]   
[facility]. [place]   
[phone]  
[facility] on   
Alcoholism and Drug Abuse -   
[phone]  
[facility]   
[address] [place]   
[phone]  
[facility] [address] [place]   
[phone]  
[facility] [address] [facility] [address] [phone]  
[facility] [phone]   
[phone]  
[facility] [address] [place] [address]   
[phone]  
[facility] [address] [place] [address] [phone]  
  
[facility] Department of   
Psychiatry & Psychology.  
Appointments: [phone]   
or [phone] (ask for   
Psychiatry & Psychology   
appointments)  
  
If you are feeling suicidal,   
please call the National   
Suicide Hotline  
1-170.819.5972 or Call 911  
  
  
  
  
Electronically signed by   
Bailey Ford at 2022   
9:31 AM EDT  
documented in this encounter            [facility]  
   
                                        2022 Miscellaneous Notes   
  
  
Formatting of this note is   
different from the original.  
Patient phones requesting   
refills as follows:  
  
  
Pending Prescriptions Disp   
Refills  
MEMANTINE 10 MG TABLET 180   
tablet 0  
Sig: Take 1 tablet by mouth   
twice daily  
KM: Yes  
  
Please review and advise.  
Bailey Ford  
  
  
  
Electronically signed by   
Bailey Ford at 2022   
9:32 AM EDTdocumented in this   
encounter                               [facility]  
   
                                        2022 Miscellaneous Notes   
  
  
Formatting of this note is   
different from the original.  
Physician: Dr. Renee  
Call from pharmacy requesting   
refill. Please E-Scribe  
  
Last OV: 21 with Dr. Renee  
Future OV: 6/10/22 with Dr. Renee  
  
  
Pending Prescriptions Disp   
Refills  
CARBIDOPA 25 MG-LEVODOPA 100   
MG TABLET 60 tablet 0  
Sig: TAKE 1 TABLET BY MOUTH   
TWICE DAILY, AT 8AM AND   
1PM(AFTER LUNCH).  
KM: No  
  
Pharmacy Name: [facility]  
Pharmacy Phone #: [phone]  
  
Nata ANTONIO Javier  
  
21  
Assessment and Plan:  
ASSESSMENT/PLAN:  
1. Glacier disease (HCC) -   
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   
(primary diagnosis)  
2. Abnormality of gait - ICD9:   
781.2, ICD10: R26.9  
3. Parkinsonism, unspecified   
Parkinsonism type (HCC) -   
ICD9: 332.0, ICD10: G20  
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  
5. Spasticity - ICD9: 781.0,   
ICD10: R25.2  
  
Overall some improvement in   
symptoms since Keppra   
increased, although later   
discovered only taking 1000mg   
BID and not 1500mg BID. As   
tolerating, will continue   
current dose. Intial concern   
was for possible progression   
and increase in chorea today   
and thus possibly need for an   
anti-dopa med with   
considerations for Abilify   
(give depression) with pt   
still not wanting   
tetrabenazine. However, chorea   
resolved and today's exam more   
suggestive of parkinsonism   
superimposed on Glacier's   
disease with pt's mother also   
noting change in gait at home.   
Discussed with patient and his   
mother and will place on trial   
of Sinemet 25/100mg in AM and   
early afternoon. Explained   
that this medication may in   
fact worsen symptoms of   
Glacier's, specifically   
Chorea, but may also improve   
symptoms suggestive of   
parkinsonism. They agree with   
plan. If any adverse effect   
they will contact us   
immediately and some the   
medication. In the meantime,   
there will be no other changes   
in meds with continuation of   
amantadine, baclofen and   
keppra as pt taking (refills   
provided).  
  
6. Dementia associated with   
other underlying disease   
without behavioral disturbance   
(HCC) - ICD9: 294.10, ICD10:   
F02.80  
No cognitive decline and pt in   
fact feels maybe  better .   
Will continue Namenda 10mg   
BID.  
  
7. Depression, unspecified   
depression type - ICD9: 311,   
ICD10: F32.A  
Stable. Will continue Zoloft   
100mg daily with pt having   
previously been informed of   
possible side effects and med   
med interactions.  
  
Follow up 3 months or sooner   
prn.  
  
Destinee Renee MD  
  
  
Electronically signed by Nata Herrera Pss at 2022   
10:57 AM EDTdocumented in this   
encounter                               [facility]  
   
                                        2022 Miscellaneous Notes   
  
  
Formatting of this note is   
different from the original.  
Physician: Dr. Renee  
Call from patient requesting   
refill. Please E-Scribe  
  
Last OV: 21 with Dr. Renee  
Future OV: 6/10/22 with Dr. Renee  
  
  
Pending Prescriptions Disp   
Refills  
CARBIDOPA 25 MG-LEVODOPA 100   
MG TABLET 60 tablet 0  
Sig: TAKE 1 TABLET BY MOUTH   
TWICE DAILY AT 8 AM AND 1PM   
(AFTER LUNCH)  
KM: No  
  
Pharmacy Name: [facility]  
Pharmacy Phone #: [phone]  
  
Nata Herrera  
  
  
Electronically signed by Nata Herrera Pss at 2022   
10:04 AM EDTdocumented in this   
encounter                               [facility]  
  
  
  
                                          
   
                                          
   
                                          
  
documented as of this encounter (statuses as of 2023)  
[facility]- History of Past illness Narrative*   
  
                          Problem      Noted Date   Diagnosed Date Resolved Date  
   
                          Amirah disease 2008  
3  
  
documented as of this encounter (statuses as of 2023)  
[facility]- History of Past illness Narrative*   
  
                          Problem      Noted Date   Diagnosed Date Resolved Date  
   
                          Amirah disease 2008  
3  
  
documented as of this encounter (statuses as of 2023)  
[facility]   
  
                                                    Diagnosis  
   
                                                      
  
  
Amirah disease (HCC)  
  
  
Glacier's chorea  
   
                                                      
  
  
Abnormality of gait  
   
                                                      
  
  
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
  
(HCC)  
   
                                                      
  
  
Spasticity  
  
  
Abnormal involuntary movements  
   
                                                      
  
  
Chorea  
  
  
Other choreas  
  
documented in this encounter  
[facility]   
  
                                                    Diagnosis  
   
                                                      
  
  
Amirah disease (HCC)- Primary  
  
  
Glacier's chorea  
   
                                                      
  
  
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
  
(HCC)  
   
                                                      
  
  
Spasticity  
  
  
Abnormal involuntary movements  
   
                                                      
  
  
Chorea  
  
  
Other choreas  
   
                                                      
  
  
Hypophonia  
  
  
Other voice and resonance disorders  
   
                                                      
  
  
Dysphagia, unspecified type  
   
                                                      
  
  
Abnormality of gait  
  
documented in this encounter  
[facility]   
  
                                                    Diagnosis  
   
                                                      
  
  
Glacier chorea (HCC)- Primary  
  
  
Glacier's chorea  
   
                                                      
  
  
Abnormality of gait  
   
                                                      
  
  
Amirah disease (HCC)  
  
  
Glacier's chorea  
   
                                                      
  
  
Spasticity  
  
  
Abnormal involuntary movements  
  
documented in this encounter  
[facility]   
  
                                                    Diagnosis  
   
                                                      
  
  
Amirah chorea (HCC)- Primary  
  
  
Amirah's chorea  
   
                                                      
  
  
Spasticity  
  
  
Abnormal involuntary movements  
   
                                                      
  
  
Abnormality of gait  
   
                                                      
  
  
Hypophonia  
  
  
Other voice and resonance disorders  
  
documented in this encounter  
[facility]   
  
                                                    Diagnosis  
   
                                                      
  
  
APPOINTMENT CANCELLED- Primary  
  
documented in this encounter  
[facility]   
  
                                                    Diagnosis  
   
                                                      
  
  
Spasticity  
  
  
Abnormal involuntary movements  
   
                                                      
  
  
Abnormality of gait  
   
                                                      
  
  
Hypophonia  
  
  
Other voice and resonance disorders  
   
                                                      
  
  
Glacier disease (HCC)  
  
  
Glacier's chorea  
   
                                                      
  
  
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
  
(HCC)  
   
                                                      
  
  
Chorea  
  
  
Other choreas  
   
                                                      
  
  
Secondary parkinsonism, unspecified secondary Parkinsonism type (HCC)  
  
documented in this encounter  
[facility]   
  
                                                    Diagnosis  
   
                                                      
  
  
Encounter to establish care- Primary  
  
  
Other reasons for seeking consultation  
   
                                                      
  
  
Screening for colon cancer  
  
  
Special screening for malignant neoplasms, colon  
   
                                                      
  
  
Elevated PSA  
  
  
Elevated prostate specific antigen (PSA)  
   
                                                      
  
  
Urinary incontinence, unspecified type  
   
                                                      
  
  
Amirah's disease (HCC)  
  
  
Amirah's chorea  
   
                                                      
  
  
Depression, recurrent (HCC)  
  
  
Major depressive disorder, recurrent episode, unspecified  
  
documented in this encounter  
[facility]  
  
Summary Purpose  
  
  
                                                      
  
  
  
Family History  
No Family History Records FoundNo Family History Records FoundNo Family History 
Records Found  
  
Advance Directives  
                                Documents on File  
  
                          Type         Date Recorded Patient Representative Expl  
anation  
   
                          Advance Directive(s)                             
  
  
  
Reason for Referral  
  
  
                          Specialty    Diagnoses / Procedures Referred By Contac  
t Referred To Contact  
   
                                                              
  
  
Diagnoses  
  
  
Amirah disease (HCC)  
  
  
Dementia associated with   
other underlying disease   
without behavioral   
disturbance (HCC)  
  
  
Spasticity  
  
  
Chorea  
  
  
Abnormality of gait  
                                          
  
  
Destinee Renee Jr., MD  
  
  
[address] [place] [address] [address]  
  
  
[place] [address]  
  
  
Phone: [phone]  
  
  
Fax: [phone]                         
  
  
  
  
  
                Referral ID Status  Reason  Start Date Expiration Date Visits Re  
quested Visits   
Authorized  
   
                [id number] Closed                          1       1  
  
  
  
                          Specialty    Diagnoses / Procedures Referred By Contac  
t Referred To Contact  
   
                                                              
  
  
Diagnoses  
  
  
Amirah disease (HCC)  
  
  
Dementia associated with   
other underlying disease   
without behavioral   
disturbance (HCC)  
  
  
Spasticity  
  
  
Chorea  
  
  
Hypophonia  
  
  
Dysphagia, unspecified type  
  
  
  
Procedures  
  
  
CONSULT TO SPEECH THERAPY                 
  
  
Destinee Renee Jr., MD  
  
  
[address] [place] [address] [address]  
  
  
[place] [address]  
  
  
Phone: [phone]  
  
  
Fax: [phone]                         
  
  
  
  
  
                          Referral ID  Status       Reason       Start   
Date                                    Expiration   
Date                                    Visits   
Requested                               Visits   
Authorized  
   
                                        [id number]            Pending   
Review                                    
  
  
PCP Requested   
Referral                                10/10/202  
2                   2023            3                   3  
  
  
  
                          Specialty    Diagnoses / Procedures Referred By Contac  
t Referred To Contact  
   
                                                    [facility]                               
  
  
Diagnoses  
  
  
Amirah chorea (HCC)  
  
  
Spasticity  
  
  
Abnormality of gait  
  
  
Hypophonia  
  
  
  
Procedures  
  
  
CONSULT TO OCCUPATIONAL   
THER  
  
  
OCCUPATIONAL THERAPY   
EVAL HIGH COMPLEX 60   
MINS                                      
  
  
Destinee Renee Jr., MD  
  
  
[address] [place] [address]   
[address]  
  
  
[place] [address]  
  
  
Phone: [phone]  
  
  
Fax: [phone]                         
  
  
[address] [address]  
  
  
  
                          Referral ID  Status       Reason       Start   
Date                                    Expiration   
Date                                    Visits   
Requested                               Visits   
Authorized  
   
                                        [id number]            Pending   
Review                                    
  
  
Auto-Generat  
ed Referral                               
2                   2023          1                   1  
  
  
  
                          Specialty    Diagnoses / Procedures Referred By Contac  
t Referred To Contact  
   
                                                    [facility]                               
  
  
Diagnoses  
  
  
Glacier chorea (HCC)  
  
  
Spasticity  
  
  
Abnormality of gait  
  
  
Hypophonia  
  
  
  
Procedures  
  
  
CONSULT TO PHYSICAL   
THERAPY  
  
  
PHYSICAL THERAPY   
EVALUATION HIGH COMPLEX   
45 MINS                                   
  
  
Destinee Renee Jr., MD  
  
  
[facility]   
[address]  
  
  
[place] [address]  
  
  
Phone: [phone]  
  
  
Fax: [phone]                         
  
  
[address] [address]  
  
  
  
                          Referral ID  Status       Reason       Start   
Date                                    Expiration   
Date                                    Visits   
Requested                               Visits   
Authorized  
   
                                        [id number]            Pending   
Review                                    
  
  
Auto-Generat  
ed Referral                               
2                   2023          1                   1  
  
  
  
                          Specialty    Diagnoses / Procedures Referred By Contac  
t Referred To Contact  
   
                                        Urology               
  
  
Diagnoses  
  
  
Elevated PSA  
  
  
  
Procedures  
  
  
CONSULT TO UROLOGY  
  
  
OFFICE/OUTPATIENT NEW HIGH   
MDM 60-74 MINUTES                         
  
  
Teresa Pienda APRN.CNP  
  
  
[address] [place] [address]  
  
  
Phone: [phone]  
  
  
Fax: [phone]                         
  
  
  
  
  
                          Referral ID  Status       Reason       Start   
Date                                    Expiration   
Date                                    Visits   
Requested                               Visits   
Authorized  
   
                                [id number]        Authorized        
  
  
PCP Requested   
Referral        2023       1               1  
  
  
  
Additional Source Comments  
  
  
  
                                                    PREGNANCY (unrecognized sect  
ion and content)  
   
                                                    No Pregnancy Status Records   
FoundNo Pregnancy Status Records FoundNo Pregnancy   
Status   
Records Found  
  
  
  
  
                                                    INFORMATION SOURCE (unrecogn  
ized section and content)  
   
                                          
  
  
  
                                          
   
                                          
  
  
  
                                DATE CREATED    AUTHOR          AUTHOR'S ORGANIZ  
ATION  
   
                                2021                      [facility]  
  
  
  
                                DATE CREATED    AUTHOR          AUTHOR'S ORGANIZ  
ATION  
   
                                2023                      [facility]  
  
  
  
  
  
                                                    Source Comments (unrecognize  
d section and content)  
   
                                                    In the event this informatio  
n is protected by the Federal Confidentiality of   
Alcohol   
and Drug Abuse Patient Records regulations: This information has been disclosed 
to   
you from records protected by Federal confidentiality rules (42 CFR Part 2). The
  
Federal rules prohibit you from making any further disclosure of this 
information   
unless further disclosure is expressly permitted by the written consent of the 
person   
to whom it pertains or as otherwise permitted by 42 CFR Part 2. A general   
authorization for the release of medical or other information is NOT sufficient 
for   
this purpose. The Federal rules restrict any use of the information to 
criminally   
investigate or prosecute any alcohol or drug abuse patient.[facility] 
the   
event this information is protected by the Federal Confidentiality of Alcohol 
and   
Drug Abuse Patient Records regulations: This information has been disclosed to 
you   
from records protected by Federal confidentiality rules (42 CFR Part 2). The 
Federal   
rules prohibit you from making any further disclosure of this information unless
  
further disclosure is expressly permitted by the written consent of the person 
to   
whom it pertains or as otherwise permitted by 42 CFR Part 2. A general 
authorization   
for the release of medical or other information is NOT sufficient for this 
purpose.   
The Federal rules restrict any use of the information to criminally investigate 
or   
prosecute any alcohol or drug abuse patient.[facility] the event this   
information is protected by the Federal Confidentiality of Alcohol and Drug 
Abuse   
Patient Records regulations: This information has been disclosed to you from 
records   
protected by Federal confidentiality rules (42 CFR Part 2). The Federal rules   
prohibit you from making any further disclosure of this information unless 
further   
disclosure is expressly permitted by the written consent of the person to whom 
it   
pertains or as otherwise permitted by 42 CFR Part 2. A general authorization for
the   
release of medical or other information is NOT sufficient for this purpose. The   
Federal rules restrict any use of the information to criminally investigate or   
prosecute any alcohol or drug abuse patient.[facility] the event this   
information is protected by the Federal Confidentiality of Alcohol and Drug 
Abuse   
Patient Records regulations: This information has been disclosed to you from 
records   
protected by Federal confidentiality rules (42 CFR Part 2). The Federal rules   
prohibit you from making any further disclosure of this information unless 
further   
disclosure is expressly permitted by the written consent of the person to whom 
it   
pertains or as otherwise permitted by 42 CFR Part 2. A general authorization for
the   
release of medical or other information is NOT sufficient for this purpose. The   
Federal rules restrict any use of the information to criminally investigate or   
prosecute any alcohol or drug abuse patient.[facility] the event this   
information is protected by the Federal Confidentiality of Alcohol and Drug 
Abuse   
Patient Records regulations: This information has been disclosed to you from 
records   
protected by Federal confidentiality rules (42 CFR Part 2). The Federal rules   
prohibit you from making any further disclosure of this information unless 
further   
disclosure is expressly permitted by the written consent of the person to whom 
it   
pertains or as otherwise permitted by 42 CFR Part 2. A general authorization for
the   
release of medical or other information is NOT sufficient for this purpose. The   
Federal rules restrict any use of the information to criminally investigate or   
prosecute any alcohol or drug abuse patient.[facility] the event this   
information is protected by the Federal Confidentiality of Alcohol and Drug 
Abuse   
Patient Records regulations: This information has been disclosed to you from 
records   
protected by Federal confidentiality rules (42 CFR Part 2). The Federal rules   
prohibit you from making any further disclosure of this information unless 
further   
disclosure is expressly permitted by the written consent of the person to whom 
it   
pertains or as otherwise permitted by 42 CFR Part 2. A general authorization for
the   
release of medical or other information is NOT sufficient for this purpose. The   
Federal rules restrict any use of the information to criminally investigate or   
prosecute any alcohol or drug abuse patient.[facility] Hospital[facility] the event this   
information is protected by the Federal Confidentiality of Alcohol and Drug 
Abuse   
Patient Records regulations: This information has been disclosed to you from 
records   
protected by Federal confidentiality rules (42 CFR Part 2). The Federal rules   
prohibit you from making any further disclosure of this information unless 
further   
disclosure is expressly permitted by the written consent of the person to whom 
it   
pertains or as otherwise permitted by 42 CFR Part 2. A general authorization for
the   
release of medical or other information is NOT sufficient for this purpose. The   
Federal rules restrict any use of the information to criminally investigate or   
prosecute any alcohol or drug abuse patient.[facility] Hospital[facility] the event this   
information is protected by the Federal Confidentiality of Alcohol and Drug 
Abuse   
Patient Records regulations: This information has been disclosed to you from 
records   
protected by Federal confidentiality rules (42 CFR Part 2). The Federal rules   
prohibit you from making any further disclosure of this information unless 
further   
disclosure is expressly permitted by the written consent of the person to whom 
it   
pertains or as otherwise permitted by 42 CFR Part 2. A general authorization for
the   
release of medical or other information is NOT sufficient for this purpose. The   
Federal rules restrict any use of the information to criminally investigate or   
prosecute any alcohol or drug abuse patient.[facility] Hospital[facility] the event this   
information is protected by the Federal Confidentiality of Alcohol and Drug 
Abuse   
Patient Records regulations: This information has been disclosed to you from 
records   
protected by Federal confidentiality rules (42 CFR Part 2). The Federal rules   
prohibit you from making any further disclosure of this information unless 
further   
disclosure is expressly permitted by the written consent of the person to whom 
it   
pertains or as otherwise permitted by 42 CFR Part 2. A general authorization for
the   
release of medical or other information is NOT sufficient for this purpose. The   
Federal rules restrict any use of the information to criminally investigate or   
prosecute any alcohol or drug abuse patient.[facility] Hospital[facility] the event this   
information is protected by the Federal Confidentiality of Alcohol and Drug 
Abuse   
Patient Records regulations: This information has been disclosed to you from 
records   
protected by Federal confidentiality rules (42 CFR Part 2). The Federal rules   
prohibit you from making any further disclosure of this information unless 
further   
disclosure is expressly permitted by the written consent of the person to whom 
it   
pertains or as otherwise permitted by 42 CFR Part 2. A general authorization for
the   
release of medical or other information is NOT sufficient for this purpose. The   
Federal rules restrict any use of the information to criminally investigate or   
prosecute any alcohol or drug abuse patient.[facility] HospitalIn the event this   
information is protected by the Federal Confidentiality of Alcohol and Drug 
Abuse   
Patient Records regulations: This information has been disclosed to you from 
records   
protected by Federal confidentiality rules (42 CFR Part 2). The Federal rules   
prohibit you from making any further disclosure of this information unless 
further   
disclosure is expressly permitted by the written consent of the person to whom 
it   
pertains or as otherwise permitted by 42 CFR Part 2. A general authorization for
the   
release of medical or other information is NOT sufficient for this purpose. The   
Federal rules restrict any use of the information to criminally investigate or   
prosecute any alcohol or drug abuse patient.[facility] HospitalIn the event this   
information is protected by the Federal Confidentiality of Alcohol and Drug 
Abuse   
Patient Records regulations: This information has been disclosed to you from 
records   
protected by Federal confidentiality rules (42 CFR Part 2). The Federal rules   
prohibit you from making any further disclosure of this information unless 
further   
disclosure is expressly permitted by the written consent of the person to whom 
it   
pertains or as otherwise permitted by 42 CFR Part 2. A general authorization for
the   
release of medical or other information is NOT sufficient for this purpose. The   
Federal rules restrict any use of the information to criminally investigate or   
prosecute any alcohol or drug abuse patient.[facility] HospitalIn the event this   
information is protected by the Federal Confidentiality of Alcohol and Drug 
Abuse   
Patient Records regulations: This information has been disclosed to you from 
records   
protected by Federal confidentiality rules (42 CFR Part 2). The Federal rules   
prohibit you from making any further disclosure of this information unless 
further   
disclosure is expressly permitted by the written consent of the person to whom 
it   
pertains or as otherwise permitted by 42 CFR Part 2. A general authorization for
the   
release of medical or other information is NOT sufficient for this purpose. The   
Federal rules restrict any use of the information to criminally investigate or   
prosecute any alcohol or drug abuse patient.[facility] HospitalIn the event this   
information is protected by the Federal Confidentiality of Alcohol and Drug 
Abuse   
Patient Records regulations: This information has been disclosed to you from 
records   
protected by Federal confidentiality rules (42 CFR Part 2). The Federal rules   
prohibit you from making any further disclosure of this information unless 
further   
disclosure is expressly permitted by the written consent of the person to whom 
it   
pertains or as otherwise permitted by 42 CFR Part 2. A general authorization for
the   
release of medical or other information is NOT sufficient for this purpose. The   
Federal rules restrict any use of the information to criminally investigate or   
prosecute any alcohol or drug abuse patient.[facility] the event this   
information is protected by the Federal Confidentiality of Alcohol and Drug 
Abuse   
Patient Records regulations: This information has been disclosed to you from 
records   
protected by Federal confidentiality rules (42 CFR Part 2). The Federal rules   
prohibit you from making any further disclosure of this information unless 
further   
disclosure is expressly permitted by the written consent of the person to whom 
it   
pertains or as otherwise permitted by 42 CFR Part 2. A general authorization for
the   
release of medical or other information is NOT sufficient for this purpose. The   
Federal rules restrict any use of the information to criminally investigate or   
prosecute any alcohol or drug abuse patient.[facility]In the event this   
information is protected by the Federal Confidentiality of Alcohol and Drug 
Abuse   
Patient Records regulations: This information has been disclosed to you from 
records   
protected by Federal confidentiality rules (42 CFR Part 2). The Federal rules   
prohibit you from making any further disclosure of this information unless 
further   
disclosure is expressly permitted by the written consent of the person to whom 
it   
pertains or as otherwise permitted by 42 CFR Part 2. A general authorization for
the   
release of medical or other information is NOT sufficient for this purpose. The   
Federal rules restrict any use of the information to criminally investigate or   
prosecute any alcohol or drug abuse patient.[facility]In the event this   
information is protected by the Federal Confidentiality of Alcohol and Drug 
Abuse   
Patient Records regulations: This information has been disclosed to you from 
records   
protected by Federal confidentiality rules (42 CFR Part 2). The Federal rules   
prohibit you from making any further disclosure of this information unless 
further   
disclosure is expressly permitted by the written consent of the person to whom 
it   
pertains or as otherwise permitted by 42 CFR Part 2. A general authorization for
the   
release of medical or other information is NOT sufficient for this purpose. The   
Federal rules restrict any use of the information to criminally investigate or   
prosecute any alcohol or drug abuse patient.[facility]In the event this   
information is protected by the Federal Confidentiality of Alcohol and Drug 
Abuse   
Patient Records regulations: This information has been disclosed to you from 
records   
protected by Federal confidentiality rules (42 CFR Part 2). The Federal rules   
prohibit you from making any further disclosure of this information unless 
further   
disclosure is expressly permitted by the written consent of the person to whom 
it   
pertains or as otherwise permitted by 42 CFR Part 2. A general authorization for
the   
release of medical or other information is NOT sufficient for this purpose. The   
Federal rules restrict any use of the information to criminally investigate or   
prosecute any alcohol or drug abuse patient.[facility]In the event this   
information is protected by the Federal Confidentiality of Alcohol and Drug 
Abuse   
Patient Records regulations: This information has been disclosed to you from 
records   
protected by Federal confidentiality rules (42 CFR Part 2). The Federal rules   
prohibit you from making any further disclosure of this information unless 
further   
disclosure is expressly permitted by the written consent of the person to whom 
it   
pertains or as otherwise permitted by 42 CFR Part 2. A general authorization for
the   
release of medical or other information is NOT sufficient for this purpose. The   
Federal rules restrict any use of the information to criminally investigate or   
prosecute any alcohol or drug abuse patient.[facility] HospitalIn the event this   
information is protected by the Federal Confidentiality of Alcohol and Drug 
Abuse   
Patient Records regulations: This information has been disclosed to you from 
records   
protected by Federal confidentiality rules (42 CFR Part 2). The Federal rules   
prohibit you from making any further disclosure of this information unless 
further   
disclosure is expressly permitted by the written consent of the person to whom 
it   
pertains or as otherwise permitted by 42 CFR Part 2. A general authorization for
the   
release of medical or other information is NOT sufficient for this purpose. The   
Federal rules restrict any use of the information to criminally investigate or   
prosecute any alcohol or drug abuse patient.[facility]  
  
  
  
  
                                                    Reason for Visit (unrecogniz  
ed section and content)  
   
                                          
  
  
  
                                          
   
                                          
  
  
  
                                Reason          Onset Date      Comments  
   
                                Refill Request  2022        
  
  
  
                                        Reason              Comments  
   
                                        Follow Up           3 month  
  
  
  
                                        Reason              Comments  
   
                                        Appointment Cancelled   
  
  
  
                                        Reason              Comments  
   
                                        Appointment Rescheduled   
  
  
  
                                Reason          Onset Date      Comments  
   
                                Refill Request  2023        
  
  
  
                                        Reason              Comments  
   
                                        Care Coordinator - Other   
  
  
  
                                Reason          Onset Date      Comments  
   
                                Refill Request  2023        
  
  
  
                                        Reason              Comments  
   
                                        Establish Care        
  
  
  
                          Specialty    Diagnoses / Procedures Referred By Contac  
t Referred To Contact  
   
                                                              
  
  
Diagnoses  
  
  
Urinary urgency  
  
  
  
Procedures  
  
  
ESTABLISH WITH PRIMARY CARE   
NEW PATIENT  
  
  
OFFICE/OUTPATIENT [facility] 60-74 MINUTES                         
  
  
Destinee Renee Jr., MD  
  
  
[address]  
  
  
[place] [address]  
  
  
Phone: [phone]  
  
  
Fax: [phone]                         
  
  
  
  
  
                    Referral ID Status    Reason    Start Date Expiration Date V  
isits   
Requested                               Visits   
Authorized  
   
                                [id number]        Closed            
  
  
PCP Requested   
Referral        2023       1               1  
  
  
  
                                        Reason              Comments  
   
                                        Results               
  
  
  
                                Reason          Onset Date      Comments  
   
                                Refill Request  2023        
  
  
  
  
  
                                                    Care Teams (unrecognized sec  
tion and content)  
   
                                          
  
  
  
                                          
   
                                          
   
                                          
  
  
  
                      Team Member Relationship Specialty  Start Date End Date  
   
                                                      
  
  
Cristopher Carmona MD  
  
  
NPI: [id number]  
  
  
[address] [place], [place] [address] [phone] (Work)  
  
  
[phone] (Fax) PCP - General   Family Medicine 23           
   
                                                      
  
  
Teresa Pineda APRN.CNP  
  
  
NPI: [id number]  
  
  
[address] [place], [place] [address] [phone] (Work)  
  
  
[phone] (Fax)                 Family Medicine 23           
  
  
FOR RECORDS PERTAINING TO PATIENTS WHO ARE OR HAVE BEEN ENROLLED IN A CHEMICAL 
DEPENDENCY/SUBSTANCEABUSE PROGRAM, SOME INFORMATION MAY BE OMITTED. This 
clinical summary was aggregated from multiple sources. Caution should be 
exercised in using it in the provision of clinical care. This summary normalizes
information from multiple sources, and as a consequence, information in this 
document may materially change the coding, format and clinical context of 
patient data. In addition, data may be omitted in some cases. CLINICAL DECISIONS
SHOULD BE BASED ON THE PRIMARY CLINICAL RECORDS. [facility]. provides 
no warranty or guarantee of the accuracy or completeness of information in this 
document.

## 2024-02-02 VITALS
SYSTOLIC BLOOD PRESSURE: 142 MMHG | RESPIRATION RATE: 18 BRPM | OXYGEN SATURATION: 96 % | HEART RATE: 68 BPM | DIASTOLIC BLOOD PRESSURE: 82 MMHG

## 2024-02-02 VITALS
HEART RATE: 68 BPM | RESPIRATION RATE: 18 BRPM | DIASTOLIC BLOOD PRESSURE: 91 MMHG | OXYGEN SATURATION: 96 % | SYSTOLIC BLOOD PRESSURE: 144 MMHG | TEMPERATURE: 96.5 F

## 2024-02-02 VITALS
TEMPERATURE: 96.44 F | SYSTOLIC BLOOD PRESSURE: 144 MMHG | RESPIRATION RATE: 18 BRPM | OXYGEN SATURATION: 96 % | HEART RATE: 68 BPM | DIASTOLIC BLOOD PRESSURE: 91 MMHG

## 2024-02-02 VITALS — HEART RATE: 68 BPM | RESPIRATION RATE: 18 BRPM | OXYGEN SATURATION: 96 %

## 2024-02-05 LAB
ALBUMIN SERPL-MCNC: 4.3 G/DL (ref 3.5–5.2)
ALP SERPL-CCNC: 100 U/L (ref 40–129)
ALT SERPL-CCNC: <5 U/L (ref 0–40)
ANION GAP SERPL CALCULATED.3IONS-SCNC: 20 MMOL/L (ref 7–16)
AST SERPL-CCNC: 30 U/L (ref 0–39)
BILIRUB SERPL-MCNC: 0.7 MG/DL (ref 0–1.2)
BUN SERPL-MCNC: 19 MG/DL (ref 6–20)
CALCIUM SERPL-MCNC: 9.8 MG/DL (ref 8.6–10.2)
CHLORIDE SERPL-SCNC: 103 MMOL/L (ref 98–107)
CK SERPL-CCNC: 561 U/L (ref 20–200)
CO2 SERPL-SCNC: 22 MMOL/L (ref 22–29)
CREAT SERPL-MCNC: 1 MG/DL (ref 0.7–1.2)
ERYTHROCYTE [DISTWIDTH] IN BLOOD BY AUTOMATED COUNT: 12.3 % (ref 11.5–15)
GFR SERPL CREATININE-BSD FRML MDRD: >60 ML/MIN/1.73M2
GLUCOSE SERPL-MCNC: 78 MG/DL (ref 74–99)
HCT VFR BLD AUTO: 46 % (ref 37–54)
HGB BLD-MCNC: 15 G/DL (ref 12.5–16.5)
MCH RBC QN AUTO: 30.7 PG (ref 26–35)
MCHC RBC AUTO-ENTMCNC: 32.6 G/DL (ref 32–34.5)
MCV RBC AUTO: 94.3 FL (ref 80–99.9)
PLATELET # BLD AUTO: 261 K/UL (ref 130–450)
PMV BLD AUTO: 9.7 FL (ref 7–12)
POTASSIUM SERPL-SCNC: 4 MMOL/L (ref 3.5–5)
PROT SERPL-MCNC: 7.9 G/DL (ref 6.4–8.3)
RBC # BLD AUTO: 4.88 M/UL (ref 3.8–5.8)
SODIUM SERPL-SCNC: 145 MMOL/L (ref 132–146)
TSH SERPL DL<=0.05 MIU/L-ACNC: 0.6 UIU/ML (ref 0.27–4.2)
WBC OTHER # BLD: 7 K/UL (ref 4.5–11.5)

## 2024-02-07 LAB
ALBUMIN SERPL-MCNC: 4.1 G/DL (ref 3.5–5.2)
ALP SERPL-CCNC: 85 U/L (ref 40–129)
ALT SERPL-CCNC: 6 U/L (ref 0–40)
ANION GAP SERPL CALCULATED.3IONS-SCNC: 7 MMOL/L (ref 7–16)
AST SERPL-CCNC: 26 U/L (ref 0–39)
BILIRUB SERPL-MCNC: 0.6 MG/DL (ref 0–1.2)
BUN SERPL-MCNC: 14 MG/DL (ref 6–20)
CALCIUM SERPL-MCNC: 9.4 MG/DL (ref 8.6–10.2)
CHLORIDE SERPL-SCNC: 105 MMOL/L (ref 98–107)
CK SERPL-CCNC: 488 U/L (ref 20–200)
CO2 SERPL-SCNC: 30 MMOL/L (ref 22–29)
CREAT SERPL-MCNC: 1 MG/DL (ref 0.7–1.2)
GFR SERPL CREATININE-BSD FRML MDRD: >60 ML/MIN/1.73M2
GLUCOSE SERPL-MCNC: 93 MG/DL (ref 74–99)
POTASSIUM SERPL-SCNC: 3.9 MMOL/L (ref 3.5–5)
PROT SERPL-MCNC: 7.3 G/DL (ref 6.4–8.3)
SODIUM SERPL-SCNC: 142 MMOL/L (ref 132–146)